# Patient Record
Sex: FEMALE | Race: WHITE | Employment: OTHER | URBAN - METROPOLITAN AREA
[De-identification: names, ages, dates, MRNs, and addresses within clinical notes are randomized per-mention and may not be internally consistent; named-entity substitution may affect disease eponyms.]

---

## 2017-01-23 PROCEDURE — 36415 COLL VENOUS BLD VENIPUNCTURE: CPT | Performed by: FAMILY MEDICINE

## 2017-01-23 PROCEDURE — 83036 HEMOGLOBIN GLYCOSYLATED A1C: CPT | Performed by: FAMILY MEDICINE

## 2017-01-23 PROCEDURE — 86780 TREPONEMA PALLIDUM: CPT | Performed by: FAMILY MEDICINE

## 2017-01-23 PROCEDURE — 84439 ASSAY OF FREE THYROXINE: CPT | Performed by: FAMILY MEDICINE

## 2017-01-23 PROCEDURE — 80061 LIPID PANEL: CPT | Performed by: FAMILY MEDICINE

## 2017-01-23 PROCEDURE — 87389 HIV-1 AG W/HIV-1&-2 AB AG IA: CPT | Performed by: FAMILY MEDICINE

## 2017-01-23 PROCEDURE — 80050 GENERAL HEALTH PANEL: CPT | Performed by: FAMILY MEDICINE

## 2017-03-15 ENCOUNTER — APPOINTMENT (OUTPATIENT)
Dept: CV DIAGNOSTICS | Facility: HOSPITAL | Age: 62
End: 2017-03-15
Attending: INTERNAL MEDICINE
Payer: COMMERCIAL

## 2017-03-15 ENCOUNTER — APPOINTMENT (OUTPATIENT)
Dept: ULTRASOUND IMAGING | Facility: HOSPITAL | Age: 62
End: 2017-03-15
Attending: EMERGENCY MEDICINE
Payer: COMMERCIAL

## 2017-03-15 ENCOUNTER — HOSPITAL ENCOUNTER (OUTPATIENT)
Facility: HOSPITAL | Age: 62
Setting detail: OBSERVATION
Discharge: HOME | End: 2017-03-16
Attending: EMERGENCY MEDICINE | Admitting: INTERNAL MEDICINE
Payer: COMMERCIAL

## 2017-03-15 ENCOUNTER — APPOINTMENT (OUTPATIENT)
Dept: GENERAL RADIOLOGY | Facility: HOSPITAL | Age: 62
End: 2017-03-15
Attending: EMERGENCY MEDICINE
Payer: COMMERCIAL

## 2017-03-15 DIAGNOSIS — E11.9 TYPE 2 DIABETES MELLITUS WITHOUT COMPLICATION, WITHOUT LONG-TERM CURRENT USE OF INSULIN (HCC): ICD-10-CM

## 2017-03-15 DIAGNOSIS — R07.9 ACUTE CHEST PAIN: Primary | ICD-10-CM

## 2017-03-15 LAB
ALBUMIN SERPL-MCNC: 4.4 G/DL (ref 3.5–4.8)
ALP LIVER SERPL-CCNC: 146 U/L (ref 50–130)
ALT SERPL-CCNC: 77 U/L (ref 14–54)
APTT PPP: 28.5 SECONDS (ref 25–34)
AST SERPL-CCNC: 57 U/L (ref 15–41)
ATRIAL RATE: 89 BPM
BASOPHILS # BLD AUTO: 0.08 X10(3) UL (ref 0–0.1)
BASOPHILS NFR BLD AUTO: 0.7 %
BILIRUB SERPL-MCNC: 0.8 MG/DL (ref 0.1–2)
BUN BLD-MCNC: 14 MG/DL (ref 8–20)
C-REACTIVE PROTEIN: 1.48 MG/DL (ref ?–1)
CALCIUM BLD-MCNC: 9.9 MG/DL (ref 8.3–10.3)
CHLORIDE: 102 MMOL/L (ref 101–111)
CO2: 24 MMOL/L (ref 22–32)
CREAT BLD-MCNC: 0.89 MG/DL (ref 0.55–1.02)
D-DIMER: <0.27 UG/ML FEU (ref 0–0.49)
EOSINOPHIL # BLD AUTO: 0 X10(3) UL (ref 0–0.3)
EOSINOPHIL NFR BLD AUTO: 0 %
ERYTHROCYTE [DISTWIDTH] IN BLOOD BY AUTOMATED COUNT: 13.2 % (ref 11.5–16)
GLUCOSE BLD-MCNC: 154 MG/DL (ref 65–99)
GLUCOSE BLD-MCNC: 197 MG/DL (ref 70–99)
HCT VFR BLD AUTO: 42.5 % (ref 34–50)
HGB BLD-MCNC: 14.8 G/DL (ref 12–16)
IMMATURE GRANULOCYTE COUNT: 0.04 X10(3) UL (ref 0–1)
IMMATURE GRANULOCYTE RATIO %: 0.3 %
INR BLD: 1.01 (ref 0.89–1.11)
LIPASE: 109 U/L (ref 73–393)
LYMPHOCYTES # BLD AUTO: 3.36 X10(3) UL (ref 0.9–4)
LYMPHOCYTES NFR BLD AUTO: 28.1 %
M PROTEIN MFR SERPL ELPH: 7.9 G/DL (ref 6.1–8.3)
MCH RBC QN AUTO: 32 PG (ref 27–33.2)
MCHC RBC AUTO-ENTMCNC: 34.8 G/DL (ref 31–37)
MCV RBC AUTO: 91.8 FL (ref 81–100)
MONOCYTES # BLD AUTO: 0.93 X10(3) UL (ref 0.1–0.6)
MONOCYTES NFR BLD AUTO: 7.8 %
NEUTROPHIL ABS PRELIM: 7.54 X10 (3) UL (ref 1.3–6.7)
NEUTROPHILS # BLD AUTO: 7.54 X10(3) UL (ref 1.3–6.7)
NEUTROPHILS NFR BLD AUTO: 63.1 %
P AXIS: 55 DEGREES
P-R INTERVAL: 152 MS
PLATELET # BLD AUTO: 208 10(3)UL (ref 150–450)
POTASSIUM SERPL-SCNC: 3.6 MMOL/L (ref 3.6–5.1)
POTASSIUM SERPL-SCNC: 4.4 MMOL/L (ref 3.6–5.1)
PSA SERPL DL<=0.01 NG/ML-MCNC: 13.3 SECONDS (ref 12–14.3)
Q-T INTERVAL: 380 MS
QRS DURATION: 74 MS
QTC CALCULATION (BEZET): 462 MS
R AXIS: 54 DEGREES
RBC # BLD AUTO: 4.63 X10(6)UL (ref 3.8–5.1)
RED CELL DISTRIBUTION WIDTH-SD: 44.5 FL (ref 35.1–46.3)
SED RATE-ML: 13 MM/HR (ref 0–25)
SODIUM SERPL-SCNC: 139 MMOL/L (ref 136–144)
T AXIS: 74 DEGREES
TROPONIN: <0.046 NG/ML (ref ?–0.05)
VENTRICULAR RATE: 89 BPM
WBC # BLD AUTO: 12 X10(3) UL (ref 4–13)

## 2017-03-15 PROCEDURE — 99285 EMERGENCY DEPT VISIT HI MDM: CPT

## 2017-03-15 PROCEDURE — 71010 XR CHEST AP PORTABLE  (CPT=71010): CPT

## 2017-03-15 PROCEDURE — 93010 ELECTROCARDIOGRAM REPORT: CPT

## 2017-03-15 PROCEDURE — 36415 COLL VENOUS BLD VENIPUNCTURE: CPT

## 2017-03-15 PROCEDURE — 93306 TTE W/DOPPLER COMPLETE: CPT

## 2017-03-15 PROCEDURE — 93005 ELECTROCARDIOGRAM TRACING: CPT

## 2017-03-15 PROCEDURE — 84484 ASSAY OF TROPONIN QUANT: CPT | Performed by: INTERNAL MEDICINE

## 2017-03-15 PROCEDURE — 86140 C-REACTIVE PROTEIN: CPT | Performed by: INTERNAL MEDICINE

## 2017-03-15 PROCEDURE — 85610 PROTHROMBIN TIME: CPT | Performed by: EMERGENCY MEDICINE

## 2017-03-15 PROCEDURE — 83690 ASSAY OF LIPASE: CPT | Performed by: EMERGENCY MEDICINE

## 2017-03-15 PROCEDURE — 85378 FIBRIN DEGRADE SEMIQUANT: CPT | Performed by: EMERGENCY MEDICINE

## 2017-03-15 PROCEDURE — 84132 ASSAY OF SERUM POTASSIUM: CPT | Performed by: HOSPITALIST

## 2017-03-15 PROCEDURE — 82962 GLUCOSE BLOOD TEST: CPT

## 2017-03-15 PROCEDURE — 84439 ASSAY OF FREE THYROXINE: CPT | Performed by: HOSPITALIST

## 2017-03-15 PROCEDURE — 84484 ASSAY OF TROPONIN QUANT: CPT | Performed by: EMERGENCY MEDICINE

## 2017-03-15 PROCEDURE — 85652 RBC SED RATE AUTOMATED: CPT | Performed by: INTERNAL MEDICINE

## 2017-03-15 PROCEDURE — 93306 TTE W/DOPPLER COMPLETE: CPT | Performed by: INTERNAL MEDICINE

## 2017-03-15 PROCEDURE — 76700 US EXAM ABDOM COMPLETE: CPT

## 2017-03-15 PROCEDURE — 80053 COMPREHEN METABOLIC PANEL: CPT | Performed by: EMERGENCY MEDICINE

## 2017-03-15 PROCEDURE — 85730 THROMBOPLASTIN TIME PARTIAL: CPT | Performed by: EMERGENCY MEDICINE

## 2017-03-15 PROCEDURE — 85025 COMPLETE CBC W/AUTO DIFF WBC: CPT | Performed by: EMERGENCY MEDICINE

## 2017-03-15 PROCEDURE — 84443 ASSAY THYROID STIM HORMONE: CPT | Performed by: HOSPITALIST

## 2017-03-15 RX ORDER — ESCITALOPRAM OXALATE 20 MG/1
20 TABLET ORAL DAILY
Status: DISCONTINUED | OUTPATIENT
Start: 2017-03-15 | End: 2017-03-16

## 2017-03-15 RX ORDER — ZOLPIDEM TARTRATE 5 MG/1
5 TABLET ORAL NIGHTLY PRN
Status: DISCONTINUED | OUTPATIENT
Start: 2017-03-15 | End: 2017-03-16

## 2017-03-15 RX ORDER — POTASSIUM CHLORIDE 20 MEQ/1
40 TABLET, EXTENDED RELEASE ORAL EVERY 4 HOURS
Status: COMPLETED | OUTPATIENT
Start: 2017-03-15 | End: 2017-03-15

## 2017-03-15 RX ORDER — ASPIRIN 81 MG/1
324 TABLET, CHEWABLE ORAL ONCE
Status: COMPLETED | OUTPATIENT
Start: 2017-03-15 | End: 2017-03-15

## 2017-03-15 RX ORDER — ALPRAZOLAM 1 MG/1
1 TABLET ORAL 2 TIMES DAILY PRN
Status: DISCONTINUED | OUTPATIENT
Start: 2017-03-15 | End: 2017-03-16

## 2017-03-15 RX ORDER — SODIUM CHLORIDE 9 MG/ML
INJECTION, SOLUTION INTRAVENOUS CONTINUOUS
Status: ACTIVE | OUTPATIENT
Start: 2017-03-15 | End: 2017-03-15

## 2017-03-15 RX ORDER — ATORVASTATIN CALCIUM 10 MG/1
20 TABLET, FILM COATED ORAL NIGHTLY
Status: DISCONTINUED | OUTPATIENT
Start: 2017-03-15 | End: 2017-03-16

## 2017-03-15 RX ORDER — LOSARTAN POTASSIUM 100 MG/1
100 TABLET ORAL DAILY
Status: DISCONTINUED | OUTPATIENT
Start: 2017-03-15 | End: 2017-03-16

## 2017-03-15 RX ORDER — ONDANSETRON 2 MG/ML
4 INJECTION INTRAMUSCULAR; INTRAVENOUS EVERY 4 HOURS PRN
Status: DISCONTINUED | OUTPATIENT
Start: 2017-03-15 | End: 2017-03-16

## 2017-03-15 RX ORDER — ACETAMINOPHEN 325 MG/1
650 TABLET ORAL EVERY 6 HOURS PRN
Status: DISCONTINUED | OUTPATIENT
Start: 2017-03-15 | End: 2017-03-16

## 2017-03-15 NOTE — H&P
AUBRIEG hospitalist H+P    PCP; José Miguel Sims MD  CC: pain    HPI 58 yo female with hx of anxiety, HTN, HL, depression presented for evaluation of chest discomfort. Started last night, described as tightness, radiated to jaw, felt seaty.  Currently no Encounter:  Zolpidem Tartrate (AMBIEN) 10 MG Oral Tab Take 1 tablet (10 mg total) by mouth nightly as needed for Sleep. Disp: 30 tablet Rfl: 5   alprazolam 1 MG Oral Tab Take 1 tablet (1 mg total) by mouth 2 (two) times daily as needed for Anxiety.  Disp: 6

## 2017-03-15 NOTE — CONSULTS
[facility] Cardiology Consultation 3/15/2017      [facility] Patient Status:  Observation    1955 MRN [id number]   [facility] 0SW-A Attending Ever Buckley MD   Hosp Day # 0 PCP Levy Su MD     Initial JAIMIE ALCANTARA at [facility]  10-23-12 [facility]     Family History   Problem Relation Age of Onset   • Heart Disease Father    • Cancer Father      prostate cancer   • Heart Disorder Father    • Heart Disease Mother    • Diabete process    Labs:   HEM:  Recent Labs   Lab  03/15/17   0602   WBC  12.0   HGB  14.8   PLT  208.0       Chem:  Recent Labs   Lab  03/15/17   0602   NA  139   K  3.6   CL  102   CO2  24.0   BUN  14   CREATSERUM  0.89   CA  9.9   GLU  197*       Recent Labs

## 2017-03-15 NOTE — ED PROVIDER NOTES
Patient Seen in: [facility] Emergency Department    History   Patient presents with:  Chest Pain Angina (cardiovascular)    Stated Complaint:     HPI    Patient is a 68-year-old female with medical history including hypertension, high cholesterol, d tablet (10 mg total) by mouth nightly as needed for Sleep. alprazolam 1 MG Oral Tab,  Take 1 tablet (1 mg total) by mouth 2 (two) times daily as needed for Anxiety. Atorvastatin Calcium 20 MG Oral Tab,  Take 1 tablet (20 mg total) by mouth once daily. nontoxic in appearance. HEENT: Head is normocephalic, atraumatic. Pupils are 3 mm equally round and reactive to light. Oropharynx is clear. Mucous membranes are moist.  NECK: There is no focal tenderness to palpation appreciated. There is no JVD.  No menin Therapeutic Range is approximately 65- 104 seconds. The therapeutic range has been validated against 0.3-0.7 heparin anti-Xa units/mL.      PROTHROMBIN TIME (PT) - Normal   LIPASE - Normal   TROPONIN I - Normal   CBC WITH DIFFERENTIAL WITH PLATELET    Tobi Chua symptoms and risk factors as noted above the patient will be admitted to the hospital for further care at this time. Patient was admitted for further care at this time.         Disposition and Plan     Clinical Impression:  Acute chest pain  (primary encou

## 2017-03-15 NOTE — PLAN OF CARE
Problem: CARDIOVASCULAR - ADULT  Goal: Maintains optimal cardiac output and hemodynamic stability  INTERVENTIONS:  - Monitor vital signs, rhythm, and trends  - Monitor for bleeding, hypotension and signs of decreased cardiac output  - Evaluate effectivenes chest pain, c/o mild pain right jaw, pain scale 0 to 10, pt scale 1  Troponin x2 normal  Dr Jaymie Wall paged for admit  Admit Navigators completed  Will continue to monitor.

## 2017-03-16 ENCOUNTER — APPOINTMENT (OUTPATIENT)
Dept: CV DIAGNOSTICS | Facility: HOSPITAL | Age: 62
End: 2017-03-16
Attending: INTERNAL MEDICINE
Payer: COMMERCIAL

## 2017-03-16 VITALS
BODY MASS INDEX: 35.44 KG/M2 | SYSTOLIC BLOOD PRESSURE: 126 MMHG | HEART RATE: 87 BPM | HEIGHT: 63 IN | OXYGEN SATURATION: 97 % | RESPIRATION RATE: 16 BRPM | WEIGHT: 200 LBS | DIASTOLIC BLOOD PRESSURE: 68 MMHG | TEMPERATURE: 98 F

## 2017-03-16 LAB
ATRIAL RATE: 65 BPM
FREE T4: 1 NG/DL (ref 0.9–1.8)
GLUCOSE BLD-MCNC: 157 MG/DL (ref 65–99)
GLUCOSE BLD-MCNC: 208 MG/DL (ref 65–99)
GLUCOSE BLD-MCNC: 264 MG/DL (ref 65–99)
P AXIS: 49 DEGREES
P-R INTERVAL: 160 MS
Q-T INTERVAL: 432 MS
QRS DURATION: 80 MS
QTC CALCULATION (BEZET): 449 MS
R AXIS: 61 DEGREES
T AXIS: 78 DEGREES
TSI SER-ACNC: 1.72 MIU/ML (ref 0.35–5.5)
VENTRICULAR RATE: 65 BPM

## 2017-03-16 PROCEDURE — 78452 HT MUSCLE IMAGE SPECT MULT: CPT

## 2017-03-16 PROCEDURE — 93010 ELECTROCARDIOGRAM REPORT: CPT | Performed by: INTERNAL MEDICINE

## 2017-03-16 PROCEDURE — 93005 ELECTROCARDIOGRAM TRACING: CPT

## 2017-03-16 PROCEDURE — 93018 CV STRESS TEST I&R ONLY: CPT | Performed by: INTERNAL MEDICINE

## 2017-03-16 PROCEDURE — 82962 GLUCOSE BLOOD TEST: CPT

## 2017-03-16 PROCEDURE — 93017 CV STRESS TEST TRACING ONLY: CPT

## 2017-03-16 RX ORDER — BLOOD-GLUCOSE METER
1 EACH MISCELLANEOUS
Qty: 1 KIT | Refills: 0 | Status: SHIPPED | OUTPATIENT
Start: 2017-03-16 | End: 2019-01-24

## 2017-03-16 RX ORDER — LANCETS 33 GAUGE
1 EACH MISCELLANEOUS
Qty: 50 EACH | Refills: 0 | Status: SHIPPED | OUTPATIENT
Start: 2017-03-16 | End: 2017-03-31

## 2017-03-16 RX ORDER — CYCLOBENZAPRINE HCL 10 MG
5 TABLET ORAL 3 TIMES DAILY PRN
Status: DISCONTINUED | OUTPATIENT
Start: 2017-03-16 | End: 2017-03-16

## 2017-03-16 RX ORDER — LANCETS 28 GAUGE
EACH MISCELLANEOUS
Qty: 50 EACH | Refills: 6 | Status: SHIPPED | OUTPATIENT
Start: 2017-03-16 | End: 2017-03-16

## 2017-03-16 RX ORDER — BLOOD-GLUCOSE METER
KIT MISCELLANEOUS
Qty: 1 KIT | Refills: 0 | Status: SHIPPED | OUTPATIENT
Start: 2017-03-16 | End: 2017-03-16

## 2017-03-16 NOTE — DISCHARGE SUMMARY
[facility]  Discharge Summary    Barry Valente Patient Status:  Observation    1955 MRN [id number]   [facility] 0SW-A Attending Yenni Thomas MD   [address] Day # 1 PCP Mavis Steele MD     Date of Admission: 3/15/2017    Date pain, jaw pain  -monitored on telemetry, troponin <0.046, D-dimer <0.027  ASA, statin  Cardiology consult appreciated, Echo done,  ESR, CRP  -no evidence of myocardial ischemia on stress test.  Per cardiology probable musculoskeletal pain    Abnormal LFTs, daily.  Qty: 90 tablet Refills: 0  Associated Diagnoses:Hyperlipidemia, unspecified hyperlipidemia type    Losartan Potassium-HCTZ 100-25 MG Oral Tab  Take 1 tablet by mouth once daily.   Qty: 90 tablet Refills: 2    escitalopram 20 MG Oral Tab  Take 1 tabl acetaminophen within 24 hours  Anant Marrero  3/16/2017  5:29 PM

## 2017-03-16 NOTE — PAYOR COMM NOTE
Attending Physician: Kiera Padgett MD    3/15    ED    Chest Pain Angina         Patient is a 64-year-old female with medical history including hypertension, high cholesterol,  presents emergency room with a history of chest tightness which started last n FEU = Fibrinogen Equivalent Units.     D-Dimer results of less than 0.5 ug/mL (FEU) have been shown to contribute to the exclusion of venous thromboembolism with a negative predictive value of approximately 95% when results are used as part of the total

## 2017-03-16 NOTE — PLAN OF CARE
Pt has been sweating off and on today. Her blood sugar is 264. Called Dr. Zack Sharma and she ordered Diabetic Education. Pt refused a glucometer at present and states that she would like to talk to Dr. Adelaida Antony first at her appointment.  She does not feel that

## 2017-03-16 NOTE — PLAN OF CARE
PT A/O, LUNGS CLEAR, DENIES CHEST PAIN OR SOB, SLIGHT RT JAW PAIN, SR, HR 70'S, INSTRUCTED PT ON POC.   CARDIOVASCULAR - ADULT    • Maintains optimal cardiac output and hemodynamic stability Progressing    • Absence of cardiac arrhythmias or at baseline Pro

## 2017-03-16 NOTE — CONSULTS
[facility]  Diabetes Consult Note    Sapna Burrell Patient Status:  Observation    1955 MRN [id number]   [facility] 0SW-A Attending Miguelito Bassett MD   Hosp Day # 1 PCP Rob Whitaker MD     Reason for Consult:     New onse back.  Dr. Rosemarie Jeffries order outpatient diabetes education; appointment is scheduled - see AVS.    Education Provided:    · Taught survival skills:  · Provided \"Understanding Diabetes: Basic Survival Skills\" booklet and had patient view video on TV Education

## 2017-03-16 NOTE — PROGRESS NOTES
CARDIODIAGNOSTIC PRELIMINARY REPORT:    SHRUTHI protocol completed with frequent ventricular abnormalities noted at peak    Denied cardiac symptoms    Base:  152/84, HR 86;      Peak:  174/86,    (94% APMHR)    Second set of images pending

## 2017-03-16 NOTE — PROGRESS NOTES
[facility] Cardiology Progress Note        Sharon Skinner Patient Status:  Observation    1955 MRN [id number]   [facility] 0SW-A Attending Saint Hausen, MD   [facility] Day # 1 SHERLYN Chacon 20 mg Oral Nightly   • escitalopram  20 mg Oral Daily   • Losartan Potassium  100 mg Oral Daily              Recent Labs   03/15/17  0602   WBC 12.0   HGB 14.8   .0       Recent Labs   03/15/17  2313 03/15/17  1407 03/15/17  0817 03/15/17  0602   BU

## 2017-03-20 NOTE — PAYOR COMM NOTE
Review Type: CONTINUED STAY  Reviewer: Julianna MIDDLETON     Date: March 20, 2017 - 12:45 PM  Payor: Paulo CHANEY [email or web address]  Authorization Number: N/A  Admit date: 3/15/2017  5:54 AM        REVIEWER COMMENTS: DISCHARGE  3-16-17

## 2017-06-22 PROBLEM — H90.3 SENSORINEURAL HEARING LOSS, BILATERAL: Status: ACTIVE | Noted: 2017-06-22

## 2018-01-16 PROBLEM — E11.9 TYPE 2 DIABETES MELLITUS WITHOUT COMPLICATION, WITHOUT LONG-TERM CURRENT USE OF INSULIN (HCC): Status: ACTIVE | Noted: 2018-01-16

## 2018-05-16 ENCOUNTER — HOSPITAL (OUTPATIENT)
Dept: OTHER | Age: 63
End: 2018-05-16
Attending: INTERNAL MEDICINE

## 2018-06-18 ENCOUNTER — HOSPITAL (OUTPATIENT)
Dept: OTHER | Age: 63
End: 2018-06-18
Attending: INTERNAL MEDICINE

## 2018-10-27 PROBLEM — E78.00 PURE HYPERCHOLESTEROLEMIA: Status: ACTIVE | Noted: 2018-10-27

## 2019-01-09 PROCEDURE — 87086 URINE CULTURE/COLONY COUNT: CPT | Performed by: FAMILY MEDICINE

## 2019-07-15 PROCEDURE — 86706 HEP B SURFACE ANTIBODY: CPT | Performed by: INTERNAL MEDICINE

## 2019-07-15 PROCEDURE — 82103 ALPHA-1-ANTITRYPSIN TOTAL: CPT | Performed by: INTERNAL MEDICINE

## 2019-07-15 PROCEDURE — 86803 HEPATITIS C AB TEST: CPT | Performed by: INTERNAL MEDICINE

## 2019-07-30 ENCOUNTER — APPOINTMENT (OUTPATIENT)
Dept: LAB | Facility: HOSPITAL | Age: 64
End: 2019-07-30
Payer: MEDICARE

## 2019-07-30 DIAGNOSIS — K40.90 RIGHT INGUINAL HERNIA: ICD-10-CM

## 2019-07-30 LAB
ALBUMIN SERPL-MCNC: 4.1 G/DL (ref 3.4–5)
ALBUMIN/GLOB SERPL: 1.1 {RATIO} (ref 1–2)
ALP LIVER SERPL-CCNC: 120 U/L (ref 55–142)
ALT SERPL-CCNC: 39 U/L (ref 13–56)
ANION GAP SERPL CALC-SCNC: 7 MMOL/L (ref 0–18)
AST SERPL-CCNC: 33 U/L (ref 15–37)
BILIRUB SERPL-MCNC: 0.4 MG/DL (ref 0.1–2)
BUN BLD-MCNC: 14 MG/DL (ref 7–18)
BUN/CREAT SERPL: 17.5 (ref 10–20)
CALCIUM BLD-MCNC: 9.7 MG/DL (ref 8.5–10.1)
CHLORIDE SERPL-SCNC: 107 MMOL/L (ref 98–112)
CO2 SERPL-SCNC: 27 MMOL/L (ref 21–32)
CREAT BLD-MCNC: 0.8 MG/DL (ref 0.55–1.02)
GLOBULIN PLAS-MCNC: 3.6 G/DL (ref 2.8–4.4)
GLUCOSE BLD-MCNC: 102 MG/DL (ref 70–99)
M PROTEIN MFR SERPL ELPH: 7.7 G/DL (ref 6.4–8.2)
OSMOLALITY SERPL CALC.SUM OF ELEC: 293 MOSM/KG (ref 275–295)
POTASSIUM SERPL-SCNC: 3.7 MMOL/L (ref 3.5–5.1)
SODIUM SERPL-SCNC: 141 MMOL/L (ref 136–145)

## 2019-07-30 PROCEDURE — 88175 CYTOPATH C/V AUTO FLUID REDO: CPT | Performed by: OBSTETRICS & GYNECOLOGY

## 2019-07-30 PROCEDURE — 80053 COMPREHEN METABOLIC PANEL: CPT

## 2019-07-30 PROCEDURE — 93005 ELECTROCARDIOGRAM TRACING: CPT

## 2019-07-30 PROCEDURE — 36415 COLL VENOUS BLD VENIPUNCTURE: CPT

## 2019-07-30 PROCEDURE — 87624 HPV HI-RISK TYP POOLED RSLT: CPT | Performed by: OBSTETRICS & GYNECOLOGY

## 2019-07-30 PROCEDURE — 93010 ELECTROCARDIOGRAM REPORT: CPT | Performed by: INTERNAL MEDICINE

## 2019-07-31 LAB
ATRIAL RATE: 68 BPM
P AXIS: 53 DEGREES
P-R INTERVAL: 164 MS
Q-T INTERVAL: 424 MS
QRS DURATION: 80 MS
QTC CALCULATION (BEZET): 450 MS
R AXIS: 47 DEGREES
T AXIS: 64 DEGREES
VENTRICULAR RATE: 68 BPM

## 2019-08-16 PROBLEM — G47.30 SLEEP APNEA: Status: ACTIVE | Noted: 2019-08-16

## 2019-08-28 PROCEDURE — 88305 TISSUE EXAM BY PATHOLOGIST: CPT | Performed by: INTERNAL MEDICINE

## 2019-10-31 PROBLEM — Z98.890 S/P LUMBAR LAMINECTOMY: Status: ACTIVE | Noted: 2019-10-31

## 2019-11-21 PROBLEM — I15.2 OBESITY, DIABETES, AND HYPERTENSION SYNDROME: Status: ACTIVE | Noted: 2019-11-21

## 2019-11-21 PROBLEM — K74.60 LIVER CIRRHOSIS SECONDARY TO NASH (HCC): Status: ACTIVE | Noted: 2019-11-21

## 2019-11-21 PROBLEM — E11.69 OBESITY, DIABETES, AND HYPERTENSION SYNDROME (HCC): Status: ACTIVE | Noted: 2019-11-21

## 2019-11-21 PROBLEM — E66.9 OBESITY, DIABETES, AND HYPERTENSION SYNDROME (HCC): Status: ACTIVE | Noted: 2019-11-21

## 2019-11-21 PROBLEM — E11.69 OBESITY, DIABETES, AND HYPERTENSION SYNDROME: Status: ACTIVE | Noted: 2019-11-21

## 2019-11-21 PROBLEM — E11.59 OBESITY, DIABETES, AND HYPERTENSION SYNDROME: Status: ACTIVE | Noted: 2019-11-21

## 2019-11-21 PROBLEM — K75.81 LIVER CIRRHOSIS SECONDARY TO NASH (HCC): Status: ACTIVE | Noted: 2019-11-21

## 2019-11-21 PROBLEM — E66.9 OBESITY, DIABETES, AND HYPERTENSION SYNDROME: Status: ACTIVE | Noted: 2019-11-21

## 2019-11-21 PROBLEM — E11.59 OBESITY, DIABETES, AND HYPERTENSION SYNDROME (HCC): Status: ACTIVE | Noted: 2019-11-21

## 2019-11-21 PROBLEM — I15.2 OBESITY, DIABETES, AND HYPERTENSION SYNDROME (HCC): Status: ACTIVE | Noted: 2019-11-21

## 2019-11-21 PROBLEM — K76.82 HEPATIC ENCEPHALOPATHY (HCC): Status: ACTIVE | Noted: 2019-11-21

## 2019-11-21 PROBLEM — K72.90 HEPATIC ENCEPHALOPATHY (HCC): Status: ACTIVE | Noted: 2019-11-21

## 2019-11-21 PROBLEM — K76.6 PORTAL HYPERTENSION (HCC): Status: ACTIVE | Noted: 2019-11-21

## 2019-11-21 PROBLEM — I85.00 ESOPHAGEAL VARICES WITHOUT BLEEDING (HCC): Status: ACTIVE | Noted: 2019-11-21

## 2019-11-21 PROBLEM — K76.82 HEPATIC ENCEPHALOPATHY: Status: ACTIVE | Noted: 2019-11-21

## 2020-03-04 PROBLEM — R07.9 ACUTE CHEST PAIN: Status: RESOLVED | Noted: 2017-03-15 | Resolved: 2020-03-04

## 2020-07-20 PROBLEM — M54.50 CHRONIC BILATERAL LOW BACK PAIN WITHOUT SCIATICA: Status: ACTIVE | Noted: 2020-07-20

## 2020-07-20 PROBLEM — G89.29 CHRONIC BILATERAL LOW BACK PAIN WITHOUT SCIATICA: Status: ACTIVE | Noted: 2020-07-20

## 2020-07-21 PROBLEM — M54.6 PAIN IN THORACIC SPINE: Status: ACTIVE | Noted: 2020-07-21

## 2020-07-21 PROBLEM — G25.81 RESTLESS LEG SYNDROME: Status: ACTIVE | Noted: 2020-07-21

## 2020-08-20 PROBLEM — M43.10 SPONDYLOLISTHESIS, GRADE 1: Status: ACTIVE | Noted: 2020-08-20

## 2020-10-27 ENCOUNTER — APPOINTMENT (OUTPATIENT)
Dept: CT IMAGING | Facility: HOSPITAL | Age: 65
End: 2020-10-27
Attending: EMERGENCY MEDICINE
Payer: MEDICARE

## 2020-10-27 ENCOUNTER — HOSPITAL ENCOUNTER (EMERGENCY)
Facility: HOSPITAL | Age: 65
Discharge: HOME | End: 2020-10-27
Attending: EMERGENCY MEDICINE
Payer: MEDICARE

## 2020-10-27 ENCOUNTER — APPOINTMENT (OUTPATIENT)
Dept: GENERAL RADIOLOGY | Facility: HOSPITAL | Age: 65
End: 2020-10-27
Attending: EMERGENCY MEDICINE
Payer: MEDICARE

## 2020-10-27 VITALS
BODY MASS INDEX: 35 KG/M2 | HEART RATE: 74 BPM | RESPIRATION RATE: 16 BRPM | SYSTOLIC BLOOD PRESSURE: 130 MMHG | TEMPERATURE: 98 F | DIASTOLIC BLOOD PRESSURE: 66 MMHG | WEIGHT: 190.06 LBS | OXYGEN SATURATION: 94 %

## 2020-10-27 DIAGNOSIS — R42 DIZZINESS: Primary | ICD-10-CM

## 2020-10-27 PROCEDURE — 36415 COLL VENOUS BLD VENIPUNCTURE: CPT

## 2020-10-27 PROCEDURE — 80320 DRUG SCREEN QUANTALCOHOLS: CPT | Performed by: EMERGENCY MEDICINE

## 2020-10-27 PROCEDURE — 82140 ASSAY OF AMMONIA: CPT | Performed by: EMERGENCY MEDICINE

## 2020-10-27 PROCEDURE — 93010 ELECTROCARDIOGRAM REPORT: CPT

## 2020-10-27 PROCEDURE — 99285 EMERGENCY DEPT VISIT HI MDM: CPT

## 2020-10-27 PROCEDURE — 81003 URINALYSIS AUTO W/O SCOPE: CPT | Performed by: EMERGENCY MEDICINE

## 2020-10-27 PROCEDURE — 85025 COMPLETE CBC W/AUTO DIFF WBC: CPT | Performed by: EMERGENCY MEDICINE

## 2020-10-27 PROCEDURE — 71045 X-RAY EXAM CHEST 1 VIEW: CPT | Performed by: EMERGENCY MEDICINE

## 2020-10-27 PROCEDURE — 80053 COMPREHEN METABOLIC PANEL: CPT | Performed by: EMERGENCY MEDICINE

## 2020-10-27 PROCEDURE — 99284 EMERGENCY DEPT VISIT MOD MDM: CPT

## 2020-10-27 PROCEDURE — 82962 GLUCOSE BLOOD TEST: CPT

## 2020-10-27 PROCEDURE — 93005 ELECTROCARDIOGRAM TRACING: CPT

## 2020-10-27 PROCEDURE — 70450 CT HEAD/BRAIN W/O DYE: CPT | Performed by: EMERGENCY MEDICINE

## 2020-10-27 NOTE — ED NOTES
Pt not taking liver medication, pt c/o dizziness, pt denies cp sob or loc, pt aox3, nad ,skin warm and intact, pt ambulates steady and unassisted

## 2020-10-27 NOTE — ED PROVIDER NOTES
Patient Seen in: [facility] Emergency Department      History   Patient presents with:  Altered Mental Status    Stated Complaint: Doesn't like taking lactolose, hasn't taken it in a week.      HPI    68-year-old female with a history of [place] cirrhos 15813, [address]375 N/A 8/28/2019    Performed by Leydi Davis MD at [facility]   • [address] / TRANSFORAMINAL EPIDURAL STEROID INJECTION Bilateral 9/4/2020    Performed by Genaro Patel DO at [address] [facility] cyanosis edema.   Mental status alert and oriented ×3  Cranial nerves II through XII within normal limits  Motor function is intact in all 4 extremities  Sensation is intact in all 4 extremities  Cerebellar finger-nose and heel-to-shin are normal  Deep tend normal neurologic exam able to ambulate without difficulty. She was discharged advised follow-up with her doctor in few days take medicine as prescribed return any problems.                    Disposition and Plan     Clinical Impression:  Dizziness  (prim

## 2020-10-27 NOTE — ED INITIAL ASSESSMENT (HPI)
Pt presents to ed from home where pt has increasing confusion and reports not taking her lactulose because it makes her nauseated

## 2020-12-03 PROBLEM — E66.9 OBESITY, DIABETES, AND HYPERTENSION SYNDROME: Status: RESOLVED | Noted: 2019-11-21 | Resolved: 2020-12-03

## 2020-12-03 PROBLEM — E66.9 OBESITY, DIABETES, AND HYPERTENSION SYNDROME (HCC): Status: RESOLVED | Noted: 2019-11-21 | Resolved: 2020-12-03

## 2020-12-03 PROBLEM — E11.59 OBESITY, DIABETES, AND HYPERTENSION SYNDROME (HCC): Status: RESOLVED | Noted: 2019-11-21 | Resolved: 2020-12-03

## 2020-12-03 PROBLEM — I15.2 OBESITY, DIABETES, AND HYPERTENSION SYNDROME: Status: RESOLVED | Noted: 2019-11-21 | Resolved: 2020-12-03

## 2020-12-03 PROBLEM — E11.69 OBESITY, DIABETES, AND HYPERTENSION SYNDROME: Status: RESOLVED | Noted: 2019-11-21 | Resolved: 2020-12-03

## 2020-12-03 PROBLEM — E11.59 OBESITY, DIABETES, AND HYPERTENSION SYNDROME: Status: RESOLVED | Noted: 2019-11-21 | Resolved: 2020-12-03

## 2020-12-03 PROBLEM — I15.2 OBESITY, DIABETES, AND HYPERTENSION SYNDROME (HCC): Status: RESOLVED | Noted: 2019-11-21 | Resolved: 2020-12-03

## 2020-12-03 PROBLEM — E11.69 OBESITY, DIABETES, AND HYPERTENSION SYNDROME (HCC): Status: RESOLVED | Noted: 2019-11-21 | Resolved: 2020-12-03

## 2020-12-28 PROBLEM — E11.65 TYPE 2 DIABETES MELLITUS WITH HYPERGLYCEMIA, WITHOUT LONG-TERM CURRENT USE OF INSULIN (HCC): Status: ACTIVE | Noted: 2020-12-28

## 2021-07-30 PROBLEM — R41.3 MEMORY LOSS: Status: ACTIVE | Noted: 2021-07-30

## 2021-08-04 ENCOUNTER — LABORATORY ENCOUNTER (OUTPATIENT)
Dept: LAB | Facility: HOSPITAL | Age: 66
End: 2021-08-04
Payer: MEDICARE

## 2021-08-04 ENCOUNTER — EKG ENCOUNTER (OUTPATIENT)
Dept: LAB | Facility: HOSPITAL | Age: 66
End: 2021-08-04
Payer: MEDICARE

## 2021-08-04 DIAGNOSIS — H25.13 NUCLEAR SCLEROTIC CATARACT OF BOTH EYES: ICD-10-CM

## 2021-08-04 LAB
ALBUMIN SERPL-MCNC: 3.8 G/DL (ref 3.4–5)
ALP LIVER SERPL-CCNC: 127 U/L
ALT SERPL-CCNC: 30 U/L
AST SERPL-CCNC: 27 U/L (ref 15–37)
ATRIAL RATE: 62 BPM
BILIRUB DIRECT SERPL-MCNC: 0.2 MG/DL (ref 0–0.2)
BILIRUB SERPL-MCNC: 0.8 MG/DL (ref 0.1–2)
M PROTEIN MFR SERPL ELPH: 7.6 G/DL (ref 6.4–8.2)
P AXIS: 56 DEGREES
P-R INTERVAL: 166 MS
Q-T INTERVAL: 428 MS
QRS DURATION: 72 MS
QTC CALCULATION (BEZET): 434 MS
R AXIS: 76 DEGREES
T AXIS: 66 DEGREES
VENTRICULAR RATE: 62 BPM

## 2021-08-04 PROCEDURE — 36415 COLL VENOUS BLD VENIPUNCTURE: CPT

## 2021-08-04 PROCEDURE — 80076 HEPATIC FUNCTION PANEL: CPT

## 2021-08-04 PROCEDURE — 93010 ELECTROCARDIOGRAM REPORT: CPT | Performed by: INTERNAL MEDICINE

## 2021-08-04 PROCEDURE — 93005 ELECTROCARDIOGRAM TRACING: CPT

## 2021-08-08 ENCOUNTER — ANESTHESIA EVENT (OUTPATIENT)
Dept: SURGERY | Facility: HOSPITAL | Age: 66
End: 2021-08-08
Payer: MEDICARE

## 2021-08-08 NOTE — ANESTHESIA PREPROCEDURE EVALUATION
PRE-OP EVALUATION    Patient Name: Hailey Rdz    Admit Diagnosis: Nuclear sclerotic cataract of right eye [H25.11]    Pre-op Diagnosis: Nuclear sclerotic cataract of right eye [H25.11]    PHACOEMULSIFICATION OF RIGHT CATARACT WITH PLACEMENT OF [employer] Tab, TAKE 1 TABLET BY MOUTH EVERY DAY, Disp: 90 tablet, Rfl: 0, 8/8/2021 at 0930  PROPRANOLOL HCL 10 MG Oral Tab, TAKE 1 TABLET BY MOUTH THREE TIMES A DAY, Disp: 270 tablet, Rfl: 0, 8/9/2021 at 0630  ATORVASTATIN 20 MG Oral Tab, TAKE 1 TABLET BY MOUTH EVER rhythm   Normal ECG   When compared with ECG of 27-OCT-2020 15:24,   No significant change was found   Confirmed by Marlen Lugo M.D., VICTOR ([id number]) on 8/4/2021 2:48:54 PM    ECG reviewed.   Exercise tolerance: good     MET: >4    (+) obesity  (+) hypertension Results   Component Value Date     08/02/2021    K 4.29 08/02/2021     08/02/2021    CO2 23.9 08/02/2021    BUN 11.0 08/02/2021    CREATSERUM 0.51 08/02/2021     (H) 08/02/2021    CA 9.4 08/02/2021            Airway      Mallampati: III

## 2021-08-09 ENCOUNTER — HOSPITAL ENCOUNTER (OUTPATIENT)
Facility: HOSPITAL | Age: 66
Setting detail: HOSPITAL OUTPATIENT SURGERY
Discharge: HOME | End: 2021-08-09
Attending: OPHTHALMOLOGY | Admitting: OPHTHALMOLOGY
Payer: MEDICARE

## 2021-08-09 ENCOUNTER — ANESTHESIA (OUTPATIENT)
Dept: SURGERY | Facility: HOSPITAL | Age: 66
End: 2021-08-09
Payer: MEDICARE

## 2021-08-09 VITALS
TEMPERATURE: 97 F | HEART RATE: 71 BPM | DIASTOLIC BLOOD PRESSURE: 70 MMHG | BODY MASS INDEX: 34.06 KG/M2 | RESPIRATION RATE: 18 BRPM | HEIGHT: 63 IN | SYSTOLIC BLOOD PRESSURE: 118 MMHG | OXYGEN SATURATION: 93 % | WEIGHT: 192.25 LBS

## 2021-08-09 DIAGNOSIS — H25.13 NUCLEAR SCLEROTIC CATARACT OF BOTH EYES: Primary | ICD-10-CM

## 2021-08-09 DIAGNOSIS — H25.11 NUCLEAR SCLEROTIC CATARACT OF RIGHT EYE: ICD-10-CM

## 2021-08-09 LAB
GLUCOSE BLD-MCNC: 126 MG/DL (ref 70–99)
GLUCOSE BLD-MCNC: 140 MG/DL (ref 70–99)

## 2021-08-09 PROCEDURE — 82962 GLUCOSE BLOOD TEST: CPT

## 2021-08-09 PROCEDURE — 08RJ3JZ REPLACEMENT OF RIGHT LENS WITH SYNTHETIC SUBSTITUTE, PERCUTANEOUS APPROACH: ICD-10-PCS | Performed by: OPHTHALMOLOGY

## 2021-08-09 DEVICE — TECNIS SMPLCTY TECNIS 1PC CLR MONO 24.5D
Type: IMPLANTABLE DEVICE | Site: EYE | Status: FUNCTIONAL
Brand: TECNIS SIMPLICITY

## 2021-08-09 RX ORDER — MOXIFLOXACIN 5 MG/ML
SOLUTION/ DROPS OPHTHALMIC AS NEEDED
Status: DISCONTINUED | OUTPATIENT
Start: 2021-08-09 | End: 2021-08-09 | Stop reason: HOSPADM

## 2021-08-09 RX ORDER — TETRACAINE HYDROCHLORIDE 5 MG/ML
1 SOLUTION OPHTHALMIC SEE ADMIN INSTRUCTIONS
Status: COMPLETED | OUTPATIENT
Start: 2021-08-09 | End: 2021-08-09

## 2021-08-09 RX ORDER — ACETAMINOPHEN 500 MG
1000 TABLET ORAL ONCE
Status: DISCONTINUED | OUTPATIENT
Start: 2021-08-09 | End: 2021-08-09

## 2021-08-09 RX ORDER — CYCLOPENTOLATE HYDROCHLORIDE 10 MG/ML
SOLUTION/ DROPS OPHTHALMIC
Status: DISCONTINUED
Start: 2021-08-09 | End: 2021-08-09

## 2021-08-09 RX ORDER — CYCLOPENTOLATE HYDROCHLORIDE 10 MG/ML
1 SOLUTION/ DROPS OPHTHALMIC SEE ADMIN INSTRUCTIONS
Status: COMPLETED | OUTPATIENT
Start: 2021-08-09 | End: 2021-08-09

## 2021-08-09 RX ORDER — LIDOCAINE HYDROCHLORIDE 10 MG/ML
INJECTION, SOLUTION EPIDURAL; INFILTRATION; INTRACAUDAL; PERINEURAL AS NEEDED
Status: DISCONTINUED | OUTPATIENT
Start: 2021-08-09 | End: 2021-08-09 | Stop reason: HOSPADM

## 2021-08-09 RX ORDER — ONDANSETRON 2 MG/ML
4 INJECTION INTRAMUSCULAR; INTRAVENOUS AS NEEDED
Status: DISCONTINUED | OUTPATIENT
Start: 2021-08-09 | End: 2021-08-09

## 2021-08-09 RX ORDER — DEXTROSE MONOHYDRATE 25 G/50ML
50 INJECTION, SOLUTION INTRAVENOUS
Status: DISCONTINUED | OUTPATIENT
Start: 2021-08-09 | End: 2021-08-09 | Stop reason: HOSPADM

## 2021-08-09 RX ORDER — PREDNISOLONE ACETATE 10 MG/ML
SUSPENSION/ DROPS OPHTHALMIC AS NEEDED
Status: DISCONTINUED | OUTPATIENT
Start: 2021-08-09 | End: 2021-08-09 | Stop reason: HOSPADM

## 2021-08-09 RX ORDER — TETRACAINE HYDROCHLORIDE 5 MG/ML
SOLUTION OPHTHALMIC
Status: COMPLETED
Start: 2021-08-09 | End: 2021-08-09

## 2021-08-09 RX ORDER — PHENYLEPHRINE HCL 2.5 %
1 DROPS OPHTHALMIC (EYE) SEE ADMIN INSTRUCTIONS
Status: COMPLETED | OUTPATIENT
Start: 2021-08-09 | End: 2021-08-09

## 2021-08-09 RX ORDER — BALANCED SALT SOLUTION 6.4; .75; .48; .3; 3.9; 1.7 MG/ML; MG/ML; MG/ML; MG/ML; MG/ML; MG/ML
SOLUTION OPHTHALMIC AS NEEDED
Status: DISCONTINUED | OUTPATIENT
Start: 2021-08-09 | End: 2021-08-09 | Stop reason: HOSPADM

## 2021-08-09 RX ORDER — MIDAZOLAM HYDROCHLORIDE 1 MG/ML
INJECTION INTRAMUSCULAR; INTRAVENOUS AS NEEDED
Status: DISCONTINUED | OUTPATIENT
Start: 2021-08-09 | End: 2021-08-09 | Stop reason: SURG

## 2021-08-09 RX ORDER — SODIUM CHLORIDE, SODIUM LACTATE, POTASSIUM CHLORIDE, CALCIUM CHLORIDE 600; 310; 30; 20 MG/100ML; MG/100ML; MG/100ML; MG/100ML
INJECTION, SOLUTION INTRAVENOUS CONTINUOUS
Status: DISCONTINUED | OUTPATIENT
Start: 2021-08-09 | End: 2021-08-09

## 2021-08-09 RX ORDER — NALOXONE HYDROCHLORIDE 0.4 MG/ML
80 INJECTION, SOLUTION INTRAMUSCULAR; INTRAVENOUS; SUBCUTANEOUS AS NEEDED
Status: DISCONTINUED | OUTPATIENT
Start: 2021-08-09 | End: 2021-08-09

## 2021-08-09 RX ORDER — PHENYLEPHRINE HCL 2.5 %
DROPS OPHTHALMIC (EYE)
Status: DISCONTINUED
Start: 2021-08-09 | End: 2021-08-09

## 2021-08-09 RX ORDER — TROPICAMIDE 10 MG/ML
SOLUTION/ DROPS OPHTHALMIC
Status: DISCONTINUED
Start: 2021-08-09 | End: 2021-08-09

## 2021-08-09 RX ORDER — TROPICAMIDE 10 MG/ML
1 SOLUTION/ DROPS OPHTHALMIC SEE ADMIN INSTRUCTIONS
Status: COMPLETED | OUTPATIENT
Start: 2021-08-09 | End: 2021-08-09

## 2021-08-09 RX ORDER — ACETAMINOPHEN 500 MG
500 TABLET ORAL EVERY 6 HOURS PRN
COMMUNITY

## 2021-08-09 RX ADMIN — MIDAZOLAM HYDROCHLORIDE 1 MG: 1 INJECTION INTRAMUSCULAR; INTRAVENOUS at 10:18:00

## 2021-08-09 RX ADMIN — MIDAZOLAM HYDROCHLORIDE 0.5 MG: 1 INJECTION INTRAMUSCULAR; INTRAVENOUS at 10:20:00

## 2021-08-09 NOTE — ANESTHESIA POSTPROCEDURE EVALUATION
[facility] Patient Status:  Hospital Outpatient Surgery   Age/Gender 74year old female MRN [id number]   [facility] SURGERY Attending Juan Luis Hayden MD   Hosp Day # 0 PCP Chon Baird MD       Anesthesia Post-op

## 2021-08-09 NOTE — OPERATIVE REPORT
DATE OF PROCEDURE: August 09, 2021    PRE OPERATIVE DIAGNOSIS: Combined age related cataract, right eye    POST OPERATIVE DIAGNOSIS: Combined age related cataract, right eye    PROCEDURE: Phacoemulsification with intraocular lens implant, right eye    SURG solution and the wounds were hydrated. The drapes and speculum were removed from the eye and Betadine solution was cleaned with sterile water. A drop of Moxifloxacin and Prednisolone was placed in the operative eye and the eye was shielded.      OPERATIVE F

## 2021-08-09 NOTE — H&P
The above referenced H&P by Dr Thurmond Cockayne on 8/2/21 was reviewed by Michel Krueger MD on 8/9/21, the patient was examined and no significant changes have occurred in the patient's condition since the H&P was performed.   I discussed with the patient and/or le

## 2021-09-01 ENCOUNTER — LABORATORY ENCOUNTER (OUTPATIENT)
Dept: LAB | Facility: HOSPITAL | Age: 66
End: 2021-09-01
Payer: MEDICARE

## 2021-09-01 DIAGNOSIS — H25.13 NUCLEAR SCLEROTIC CATARACT OF BOTH EYES: ICD-10-CM

## 2021-09-01 LAB
ALBUMIN SERPL-MCNC: 3.9 G/DL (ref 3.4–5)
ALBUMIN/GLOB SERPL: 1.1 {RATIO} (ref 1–2)
ALP LIVER SERPL-CCNC: 121 U/L
ALT SERPL-CCNC: 35 U/L
ANION GAP SERPL CALC-SCNC: 3 MMOL/L (ref 0–18)
AST SERPL-CCNC: 26 U/L (ref 15–37)
BILIRUB SERPL-MCNC: 0.5 MG/DL (ref 0.1–2)
BUN BLD-MCNC: 10 MG/DL (ref 7–18)
CALCIUM BLD-MCNC: 9.2 MG/DL (ref 8.5–10.1)
CHLORIDE SERPL-SCNC: 111 MMOL/L (ref 98–112)
CO2 SERPL-SCNC: 27 MMOL/L (ref 21–32)
CREAT BLD-MCNC: 0.73 MG/DL
GLOBULIN PLAS-MCNC: 3.7 G/DL (ref 2.8–4.4)
GLUCOSE BLD-MCNC: 160 MG/DL (ref 70–99)
M PROTEIN MFR SERPL ELPH: 7.6 G/DL (ref 6.4–8.2)
OSMOLALITY SERPL CALC.SUM OF ELEC: 294 MOSM/KG (ref 275–295)
PATIENT FASTING Y/N/NP: YES
POTASSIUM SERPL-SCNC: 4.1 MMOL/L (ref 3.5–5.1)
SODIUM SERPL-SCNC: 141 MMOL/L (ref 136–145)

## 2021-09-01 PROCEDURE — 80053 COMPREHEN METABOLIC PANEL: CPT

## 2021-09-01 PROCEDURE — 36415 COLL VENOUS BLD VENIPUNCTURE: CPT

## 2021-09-10 RX ORDER — SODIUM CHLORIDE, SODIUM LACTATE, POTASSIUM CHLORIDE, CALCIUM CHLORIDE 600; 310; 30; 20 MG/100ML; MG/100ML; MG/100ML; MG/100ML
INJECTION, SOLUTION INTRAVENOUS CONTINUOUS
Status: CANCELLED | OUTPATIENT
Start: 2021-09-10

## 2021-09-13 ENCOUNTER — ANESTHESIA (OUTPATIENT)
Dept: SURGERY | Facility: HOSPITAL | Age: 66
End: 2021-09-13
Payer: MEDICARE

## 2021-09-13 ENCOUNTER — HOSPITAL ENCOUNTER (OUTPATIENT)
Facility: HOSPITAL | Age: 66
Setting detail: HOSPITAL OUTPATIENT SURGERY
Discharge: HOME | End: 2021-09-13
Attending: OPHTHALMOLOGY | Admitting: OPHTHALMOLOGY
Payer: MEDICARE

## 2021-09-13 ENCOUNTER — ANESTHESIA EVENT (OUTPATIENT)
Dept: SURGERY | Facility: HOSPITAL | Age: 66
End: 2021-09-13
Payer: MEDICARE

## 2021-09-13 VITALS
HEIGHT: 63 IN | OXYGEN SATURATION: 95 % | RESPIRATION RATE: 16 BRPM | WEIGHT: 177.25 LBS | HEART RATE: 64 BPM | TEMPERATURE: 97 F | SYSTOLIC BLOOD PRESSURE: 108 MMHG | DIASTOLIC BLOOD PRESSURE: 43 MMHG | BODY MASS INDEX: 31.41 KG/M2

## 2021-09-13 DIAGNOSIS — Z01.818 PRE-OP TESTING: Primary | ICD-10-CM

## 2021-09-13 DIAGNOSIS — H25.12 NUCLEAR SCLEROTIC CATARACT OF LEFT EYE: ICD-10-CM

## 2021-09-13 LAB
GLUCOSE BLD-MCNC: 138 MG/DL (ref 70–99)
SARS-COV-2 RNA RESP QL NAA+PROBE: NOT DETECTED

## 2021-09-13 PROCEDURE — 82962 GLUCOSE BLOOD TEST: CPT

## 2021-09-13 PROCEDURE — 08RK3JZ REPLACEMENT OF LEFT LENS WITH SYNTHETIC SUBSTITUTE, PERCUTANEOUS APPROACH: ICD-10-PCS | Performed by: OPHTHALMOLOGY

## 2021-09-13 DEVICE — IMPLANTABLE DEVICE: Type: IMPLANTABLE DEVICE | Site: EYE | Status: FUNCTIONAL

## 2021-09-13 RX ORDER — TROPICAMIDE 10 MG/ML
1 SOLUTION/ DROPS OPHTHALMIC SEE ADMIN INSTRUCTIONS
Status: COMPLETED | OUTPATIENT
Start: 2021-09-13 | End: 2021-09-13

## 2021-09-13 RX ORDER — ONDANSETRON 2 MG/ML
4 INJECTION INTRAMUSCULAR; INTRAVENOUS AS NEEDED
Status: CANCELLED | OUTPATIENT
Start: 2021-09-13 | End: 2021-09-13

## 2021-09-13 RX ORDER — ACETAMINOPHEN 500 MG
1000 TABLET ORAL ONCE AS NEEDED
Status: CANCELLED | OUTPATIENT
Start: 2021-09-13 | End: 2021-09-13

## 2021-09-13 RX ORDER — PHENYLEPHRINE HCL 2.5 %
1 DROPS OPHTHALMIC (EYE) SEE ADMIN INSTRUCTIONS
Status: COMPLETED | OUTPATIENT
Start: 2021-09-13 | End: 2021-09-13

## 2021-09-13 RX ORDER — TETRACAINE HYDROCHLORIDE 5 MG/ML
1 SOLUTION OPHTHALMIC SEE ADMIN INSTRUCTIONS
Status: COMPLETED | OUTPATIENT
Start: 2021-09-13 | End: 2021-09-13

## 2021-09-13 RX ORDER — ACETAMINOPHEN 500 MG
TABLET ORAL
Status: COMPLETED
Start: 2021-09-13 | End: 2021-09-13

## 2021-09-13 RX ORDER — DEXTROSE MONOHYDRATE 25 G/50ML
50 INJECTION, SOLUTION INTRAVENOUS
Status: CANCELLED | OUTPATIENT
Start: 2021-09-13

## 2021-09-13 RX ORDER — HYDROCODONE BITARTRATE AND ACETAMINOPHEN 5; 325 MG/1; MG/1
2 TABLET ORAL AS NEEDED
Status: CANCELLED | OUTPATIENT
Start: 2021-09-13

## 2021-09-13 RX ORDER — SODIUM CHLORIDE, SODIUM LACTATE, POTASSIUM CHLORIDE, CALCIUM CHLORIDE 600; 310; 30; 20 MG/100ML; MG/100ML; MG/100ML; MG/100ML
INJECTION, SOLUTION INTRAVENOUS CONTINUOUS
Status: CANCELLED | OUTPATIENT
Start: 2021-09-13

## 2021-09-13 RX ORDER — LIDOCAINE HYDROCHLORIDE 10 MG/ML
INJECTION, SOLUTION EPIDURAL; INFILTRATION; INTRACAUDAL; PERINEURAL AS NEEDED
Status: DISCONTINUED | OUTPATIENT
Start: 2021-09-13 | End: 2021-09-13 | Stop reason: HOSPADM

## 2021-09-13 RX ORDER — PREDNISOLONE ACETATE 10 MG/ML
SUSPENSION/ DROPS OPHTHALMIC AS NEEDED
Status: DISCONTINUED | OUTPATIENT
Start: 2021-09-13 | End: 2021-09-13 | Stop reason: HOSPADM

## 2021-09-13 RX ORDER — DEXTROSE MONOHYDRATE 25 G/50ML
50 INJECTION, SOLUTION INTRAVENOUS
Status: DISCONTINUED | OUTPATIENT
Start: 2021-09-13 | End: 2021-09-13 | Stop reason: HOSPADM

## 2021-09-13 RX ORDER — MIDAZOLAM HYDROCHLORIDE 1 MG/ML
INJECTION INTRAMUSCULAR; INTRAVENOUS AS NEEDED
Status: DISCONTINUED | OUTPATIENT
Start: 2021-09-13 | End: 2021-09-13 | Stop reason: SURG

## 2021-09-13 RX ORDER — BALANCED SALT SOLUTION 6.4; .75; .48; .3; 3.9; 1.7 MG/ML; MG/ML; MG/ML; MG/ML; MG/ML; MG/ML
SOLUTION OPHTHALMIC AS NEEDED
Status: DISCONTINUED | OUTPATIENT
Start: 2021-09-13 | End: 2021-09-13 | Stop reason: HOSPADM

## 2021-09-13 RX ORDER — MOXIFLOXACIN 5 MG/ML
SOLUTION/ DROPS OPHTHALMIC AS NEEDED
Status: DISCONTINUED | OUTPATIENT
Start: 2021-09-13 | End: 2021-09-13 | Stop reason: HOSPADM

## 2021-09-13 RX ORDER — HYDROCODONE BITARTRATE AND ACETAMINOPHEN 5; 325 MG/1; MG/1
1 TABLET ORAL AS NEEDED
Status: CANCELLED | OUTPATIENT
Start: 2021-09-13

## 2021-09-13 RX ORDER — TETRACAINE HYDROCHLORIDE 5 MG/ML
SOLUTION OPHTHALMIC AS NEEDED
Status: DISCONTINUED | OUTPATIENT
Start: 2021-09-13 | End: 2021-09-13 | Stop reason: HOSPADM

## 2021-09-13 RX ORDER — CYCLOPENTOLATE HYDROCHLORIDE 10 MG/ML
1 SOLUTION/ DROPS OPHTHALMIC SEE ADMIN INSTRUCTIONS
Status: COMPLETED | OUTPATIENT
Start: 2021-09-13 | End: 2021-09-13

## 2021-09-13 RX ORDER — NALOXONE HYDROCHLORIDE 0.4 MG/ML
80 INJECTION, SOLUTION INTRAMUSCULAR; INTRAVENOUS; SUBCUTANEOUS AS NEEDED
Status: CANCELLED | OUTPATIENT
Start: 2021-09-13 | End: 2021-09-13

## 2021-09-13 RX ORDER — SODIUM CHLORIDE, SODIUM LACTATE, POTASSIUM CHLORIDE, CALCIUM CHLORIDE 600; 310; 30; 20 MG/100ML; MG/100ML; MG/100ML; MG/100ML
INJECTION, SOLUTION INTRAVENOUS CONTINUOUS
Status: DISCONTINUED | OUTPATIENT
Start: 2021-09-13 | End: 2021-09-13

## 2021-09-13 RX ORDER — HYDROMORPHONE HYDROCHLORIDE 1 MG/ML
0.4 INJECTION, SOLUTION INTRAMUSCULAR; INTRAVENOUS; SUBCUTANEOUS EVERY 5 MIN PRN
Status: CANCELLED | OUTPATIENT
Start: 2021-09-13 | End: 2021-09-13

## 2021-09-13 RX ADMIN — MIDAZOLAM HYDROCHLORIDE 2 MG: 1 INJECTION INTRAMUSCULAR; INTRAVENOUS at 08:33:00

## 2021-09-13 NOTE — ANESTHESIA POSTPROCEDURE EVALUATION
[facility] Patient Status:  Hospital Outpatient Surgery   Age/Gender 74year old female MRN [id number]   Location [address] [place] [address] Attending Kiran Guy MD   [address] Day # 0 PCP Dafne Nava MD

## 2021-09-13 NOTE — H&P
History & Physical Examination    Patient Name: Aashish Carrera  MRN: [id number]  CSN: [id number]  YOB: 1955    Diagnosis: visually significant cataract left eye    Present Illness: 73 yo female with blurred vision found to have catarac 9/12/2021 at Unknown time  Accu-Chek Softclix Lancets Does not apply Misc, Use to test blood glucose daily. , Disp: 100 each, Rfl: 0, 9/12/2021 at Unknown time  Glucose Blood (ACCU-CHEK GIANA) In Vitro Strip, Use one strip to test blood sugar once daily.   D essential hypertension    • Visual impairment     glasses/contact lenses     Past Surgical History:   Procedure Laterality Date   • APPENDECTOMY  10-23-12 [facility]   • BIOPSY OF BREAST, INCISIONAL     •      • COLONOSCOPY  19= Diverticul and Physical done within the last 30 days. Any changes noted above.     Merly Reddy MD  9/13/2021  7:24 AM

## 2021-09-13 NOTE — OPERATIVE REPORT
DATE OF PROCEDURE: 09/13/21    PRE OPERATIVE OP DIAGNOSIS: Combined age related cataract, left eye    POST OPERATIVE DIAGNOSIS: Combined age related cataract, left eye    PROCEDURE: Phacoemulsification with intraocular lens implant, left eye    SURGEON: Nahid and the wounds were hydrated. The drapes and speculum were removed from the eye and Betadine solution was cleaned with sterile water. A drop of Moxifloxacin and Prednisolone was placed in the operative eye and the eye was shielded.      OPERATIVE FINDINGS:

## 2021-09-13 NOTE — ANESTHESIA PREPROCEDURE EVALUATION
PRE-OP EVALUATION    Patient Name: Erica Lindsey    Admit Diagnosis: Nuclear sclerotic cataract of left eye [H25.12]    Pre-op Diagnosis: Nuclear sclerotic cataract of left eye [H25.12]    PHACOEMULSIFICATION OF LEFT CATARACT WITH PLACEMENT OF IN reviewed.   Exercise tolerance: good     MET: >4    (+) obesity  (+) hypertension                       (-) angina     (-) CORRAL  (-) orthopnea       Endo/Other      (+) diabetes  type 2,                          Pulmonary      (-) asthma         (-) tanisha 09/01/2021    CO2 23.9 08/02/2021    BUN 10 09/01/2021    BUN 11.0 08/02/2021    CREATSERUM 0.73 09/01/2021    CREATSERUM 0.51 08/02/2021     (H) 09/01/2021     (H) 08/02/2021    CA 9.2 09/01/2021    CA 9.4 08/02/2021            Airway      M

## 2022-06-29 ENCOUNTER — LAB ENCOUNTER (OUTPATIENT)
Dept: LAB | Age: 67
End: 2022-06-29
Attending: ORTHOPAEDIC SURGERY
Payer: MEDICARE

## 2022-06-29 DIAGNOSIS — Z01.818 PREOP TESTING: ICD-10-CM

## 2022-06-29 LAB — SARS-COV-2 RNA RESP QL NAA+PROBE: NOT DETECTED

## 2022-07-01 ENCOUNTER — HOSPITAL ENCOUNTER (OUTPATIENT)
Facility: HOSPITAL | Age: 67
Setting detail: HOSPITAL OUTPATIENT SURGERY
Discharge: HOME | End: 2022-07-01
Attending: ORTHOPAEDIC SURGERY | Admitting: ORTHOPAEDIC SURGERY
Payer: MEDICARE

## 2022-07-01 ENCOUNTER — APPOINTMENT (OUTPATIENT)
Dept: LAB | Facility: HOSPITAL | Age: 67
End: 2022-07-01
Attending: ORTHOPAEDIC SURGERY
Payer: MEDICARE

## 2022-07-01 ENCOUNTER — ANESTHESIA (OUTPATIENT)
Dept: SURGERY | Facility: HOSPITAL | Age: 67
End: 2022-07-01
Payer: MEDICARE

## 2022-07-01 ENCOUNTER — ANESTHESIA EVENT (OUTPATIENT)
Dept: SURGERY | Facility: HOSPITAL | Age: 67
End: 2022-07-01
Payer: MEDICARE

## 2022-07-01 ENCOUNTER — HOSPITAL ENCOUNTER (OUTPATIENT)
Facility: HOSPITAL | Age: 67
Discharge: HOME | End: 2022-07-01
Attending: ORTHOPAEDIC SURGERY | Admitting: ORTHOPAEDIC SURGERY
Payer: MEDICARE

## 2022-07-01 VITALS
HEART RATE: 88 BPM | BODY MASS INDEX: 36.01 KG/M2 | HEIGHT: 62 IN | TEMPERATURE: 97 F | RESPIRATION RATE: 18 BRPM | OXYGEN SATURATION: 93 % | SYSTOLIC BLOOD PRESSURE: 133 MMHG | DIASTOLIC BLOOD PRESSURE: 77 MMHG | WEIGHT: 195.69 LBS

## 2022-07-01 DIAGNOSIS — Z01.818 PREOP TESTING: Primary | ICD-10-CM

## 2022-07-01 LAB
GLUCOSE BLDC GLUCOMTR-MCNC: 193 MG/DL (ref 70–99)
GLUCOSE BLDC GLUCOMTR-MCNC: 220 MG/DL (ref 70–99)

## 2022-07-01 PROCEDURE — 0RHK44Z INSERTION OF INTERNAL FIXATION DEVICE INTO LEFT SHOULDER JOINT, PERCUTANEOUS ENDOSCOPIC APPROACH: ICD-10-PCS | Performed by: ORTHOPAEDIC SURGERY

## 2022-07-01 PROCEDURE — 0RNK4ZZ RELEASE LEFT SHOULDER JOINT, PERCUTANEOUS ENDOSCOPIC APPROACH: ICD-10-PCS | Performed by: ORTHOPAEDIC SURGERY

## 2022-07-01 PROCEDURE — 82962 GLUCOSE BLOOD TEST: CPT

## 2022-07-01 PROCEDURE — 0LM24ZZ REATTACHMENT OF LEFT SHOULDER TENDON, PERCUTANEOUS ENDOSCOPIC APPROACH: ICD-10-PCS | Performed by: ORTHOPAEDIC SURGERY

## 2022-07-01 PROCEDURE — 0RBK4ZZ EXCISION OF LEFT SHOULDER JOINT, PERCUTANEOUS ENDOSCOPIC APPROACH: ICD-10-PCS | Performed by: ORTHOPAEDIC SURGERY

## 2022-07-01 PROCEDURE — 0PBB4ZZ EXCISION OF LEFT CLAVICLE, PERCUTANEOUS ENDOSCOPIC APPROACH: ICD-10-PCS | Performed by: ORTHOPAEDIC SURGERY

## 2022-07-01 PROCEDURE — 0LS44ZZ REPOSITION LEFT UPPER ARM TENDON, PERCUTANEOUS ENDOSCOPIC APPROACH: ICD-10-PCS | Performed by: ORTHOPAEDIC SURGERY

## 2022-07-01 PROCEDURE — 76942 ECHO GUIDE FOR BIOPSY: CPT | Performed by: ORTHOPAEDIC SURGERY

## 2022-07-01 PROCEDURE — 64415 NJX AA&/STRD BRCH PLXS IMG: CPT | Performed by: ORTHOPAEDIC SURGERY

## 2022-07-01 DEVICE — IMPLANTABLE DEVICE: Type: IMPLANTABLE DEVICE | Site: SHOULDER | Status: FUNCTIONAL

## 2022-07-01 DEVICE — ANCHR/SCREW SWIVELCK PEEK 4.75: Type: IMPLANTABLE DEVICE | Site: SHOULDER | Status: FUNCTIONAL

## 2022-07-01 RX ORDER — SODIUM CHLORIDE, SODIUM LACTATE, POTASSIUM CHLORIDE, CALCIUM CHLORIDE 600; 310; 30; 20 MG/100ML; MG/100ML; MG/100ML; MG/100ML
INJECTION, SOLUTION INTRAVENOUS CONTINUOUS
Status: DISCONTINUED | OUTPATIENT
Start: 2022-07-01 | End: 2022-07-01

## 2022-07-01 RX ORDER — ROCURONIUM BROMIDE 10 MG/ML
INJECTION, SOLUTION INTRAVENOUS AS NEEDED
Status: DISCONTINUED | OUTPATIENT
Start: 2022-07-01 | End: 2022-07-01 | Stop reason: SURG

## 2022-07-01 RX ORDER — ONDANSETRON 2 MG/ML
INJECTION INTRAMUSCULAR; INTRAVENOUS AS NEEDED
Status: DISCONTINUED | OUTPATIENT
Start: 2022-07-01 | End: 2022-07-01 | Stop reason: SURG

## 2022-07-01 RX ORDER — INSULIN ASPART 100 [IU]/ML
INJECTION, SOLUTION INTRAVENOUS; SUBCUTANEOUS ONCE
Status: COMPLETED | OUTPATIENT
Start: 2022-07-01 | End: 2022-07-01

## 2022-07-01 RX ORDER — ROPIVACAINE HYDROCHLORIDE 5 MG/ML
INJECTION, SOLUTION EPIDURAL; INFILTRATION; PERINEURAL AS NEEDED
Status: DISCONTINUED | OUTPATIENT
Start: 2022-07-01 | End: 2022-07-01 | Stop reason: SURG

## 2022-07-01 RX ORDER — METOPROLOL TARTRATE 5 MG/5ML
2.5 INJECTION INTRAVENOUS ONCE
Status: DISCONTINUED | OUTPATIENT
Start: 2022-07-01 | End: 2022-07-01

## 2022-07-01 RX ORDER — FAMOTIDINE 20 MG/1
20 TABLET, FILM COATED ORAL ONCE
Status: COMPLETED | OUTPATIENT
Start: 2022-07-01 | End: 2022-07-01

## 2022-07-01 RX ORDER — VANCOMYCIN HYDROCHLORIDE
15 ONCE
Status: COMPLETED | OUTPATIENT
Start: 2022-07-01 | End: 2022-07-01

## 2022-07-01 RX ORDER — MORPHINE SULFATE 10 MG/ML
6 INJECTION, SOLUTION INTRAMUSCULAR; INTRAVENOUS EVERY 10 MIN PRN
Status: DISCONTINUED | OUTPATIENT
Start: 2022-07-01 | End: 2022-07-01

## 2022-07-01 RX ORDER — NICOTINE POLACRILEX 4 MG
15 LOZENGE BUCCAL
Status: DISCONTINUED | OUTPATIENT
Start: 2022-07-01 | End: 2022-07-01

## 2022-07-01 RX ORDER — MIDAZOLAM HYDROCHLORIDE 1 MG/ML
INJECTION INTRAMUSCULAR; INTRAVENOUS AS NEEDED
Status: DISCONTINUED | OUTPATIENT
Start: 2022-07-01 | End: 2022-07-01 | Stop reason: SURG

## 2022-07-01 RX ORDER — MORPHINE SULFATE 4 MG/ML
4 INJECTION, SOLUTION INTRAMUSCULAR; INTRAVENOUS EVERY 10 MIN PRN
Status: DISCONTINUED | OUTPATIENT
Start: 2022-07-01 | End: 2022-07-01

## 2022-07-01 RX ORDER — NALOXONE HYDROCHLORIDE 0.4 MG/ML
80 INJECTION, SOLUTION INTRAMUSCULAR; INTRAVENOUS; SUBCUTANEOUS AS NEEDED
Status: DISCONTINUED | OUTPATIENT
Start: 2022-07-01 | End: 2022-07-01

## 2022-07-01 RX ORDER — LIDOCAINE HYDROCHLORIDE 10 MG/ML
INJECTION, SOLUTION EPIDURAL; INFILTRATION; INTRACAUDAL; PERINEURAL AS NEEDED
Status: DISCONTINUED | OUTPATIENT
Start: 2022-07-01 | End: 2022-07-01 | Stop reason: SURG

## 2022-07-01 RX ORDER — DEXAMETHASONE SODIUM PHOSPHATE 10 MG/ML
INJECTION, SOLUTION INTRAMUSCULAR; INTRAVENOUS AS NEEDED
Status: DISCONTINUED | OUTPATIENT
Start: 2022-07-01 | End: 2022-07-01 | Stop reason: SURG

## 2022-07-01 RX ORDER — HYDROMORPHONE HYDROCHLORIDE 1 MG/ML
0.4 INJECTION, SOLUTION INTRAMUSCULAR; INTRAVENOUS; SUBCUTANEOUS EVERY 5 MIN PRN
Status: DISCONTINUED | OUTPATIENT
Start: 2022-07-01 | End: 2022-07-01

## 2022-07-01 RX ORDER — EPHEDRINE SULFATE 50 MG/ML
INJECTION, SOLUTION INTRAVENOUS AS NEEDED
Status: DISCONTINUED | OUTPATIENT
Start: 2022-07-01 | End: 2022-07-01 | Stop reason: SURG

## 2022-07-01 RX ORDER — DEXAMETHASONE SODIUM PHOSPHATE 4 MG/ML
VIAL (ML) INJECTION AS NEEDED
Status: DISCONTINUED | OUTPATIENT
Start: 2022-07-01 | End: 2022-07-01 | Stop reason: SURG

## 2022-07-01 RX ORDER — HYDROMORPHONE HYDROCHLORIDE 1 MG/ML
0.6 INJECTION, SOLUTION INTRAMUSCULAR; INTRAVENOUS; SUBCUTANEOUS EVERY 5 MIN PRN
Status: DISCONTINUED | OUTPATIENT
Start: 2022-07-01 | End: 2022-07-01

## 2022-07-01 RX ORDER — NICOTINE POLACRILEX 4 MG
30 LOZENGE BUCCAL
Status: DISCONTINUED | OUTPATIENT
Start: 2022-07-01 | End: 2022-07-01

## 2022-07-01 RX ORDER — DEXTROSE MONOHYDRATE 25 G/50ML
50 INJECTION, SOLUTION INTRAVENOUS
Status: DISCONTINUED | OUTPATIENT
Start: 2022-07-01 | End: 2022-07-01

## 2022-07-01 RX ORDER — HYDROMORPHONE HYDROCHLORIDE 1 MG/ML
0.2 INJECTION, SOLUTION INTRAMUSCULAR; INTRAVENOUS; SUBCUTANEOUS EVERY 5 MIN PRN
Status: DISCONTINUED | OUTPATIENT
Start: 2022-07-01 | End: 2022-07-01

## 2022-07-01 RX ORDER — MORPHINE SULFATE 4 MG/ML
2 INJECTION, SOLUTION INTRAMUSCULAR; INTRAVENOUS EVERY 10 MIN PRN
Status: DISCONTINUED | OUTPATIENT
Start: 2022-07-01 | End: 2022-07-01

## 2022-07-01 RX ORDER — ACETAMINOPHEN 500 MG
1000 TABLET ORAL ONCE
Status: COMPLETED | OUTPATIENT
Start: 2022-07-01 | End: 2022-07-01

## 2022-07-01 RX ADMIN — SODIUM CHLORIDE, SODIUM LACTATE, POTASSIUM CHLORIDE, CALCIUM CHLORIDE: 600; 310; 30; 20 INJECTION, SOLUTION INTRAVENOUS at 18:41:00

## 2022-07-01 RX ADMIN — EPHEDRINE SULFATE 5 MG: 50 INJECTION, SOLUTION INTRAVENOUS at 16:41:00

## 2022-07-01 RX ADMIN — LIDOCAINE HYDROCHLORIDE 2 ML: 10 INJECTION, SOLUTION EPIDURAL; INFILTRATION; INTRACAUDAL; PERINEURAL at 15:48:00

## 2022-07-01 RX ADMIN — ROCURONIUM BROMIDE 40 MG: 10 INJECTION, SOLUTION INTRAVENOUS at 16:24:00

## 2022-07-01 RX ADMIN — MIDAZOLAM HYDROCHLORIDE 2 MG: 1 INJECTION INTRAMUSCULAR; INTRAVENOUS at 15:48:00

## 2022-07-01 RX ADMIN — EPHEDRINE SULFATE 5 MG: 50 INJECTION, SOLUTION INTRAVENOUS at 16:46:00

## 2022-07-01 RX ADMIN — DEXAMETHASONE SODIUM PHOSPHATE 4 MG: 10 INJECTION, SOLUTION INTRAMUSCULAR; INTRAVENOUS at 15:52:00

## 2022-07-01 RX ADMIN — DEXAMETHASONE SODIUM PHOSPHATE 4 MG: 4 MG/ML VIAL (ML) INJECTION at 16:44:00

## 2022-07-01 RX ADMIN — EPHEDRINE SULFATE 5 MG: 50 INJECTION, SOLUTION INTRAVENOUS at 16:51:00

## 2022-07-01 RX ADMIN — LIDOCAINE HYDROCHLORIDE 50 MG: 10 INJECTION, SOLUTION EPIDURAL; INFILTRATION; INTRACAUDAL; PERINEURAL at 16:22:00

## 2022-07-01 RX ADMIN — ONDANSETRON 4 MG: 2 INJECTION INTRAMUSCULAR; INTRAVENOUS at 16:44:00

## 2022-07-01 RX ADMIN — SODIUM CHLORIDE, SODIUM LACTATE, POTASSIUM CHLORIDE, CALCIUM CHLORIDE: 600; 310; 30; 20 INJECTION, SOLUTION INTRAVENOUS at 19:10:00

## 2022-07-01 RX ADMIN — ROPIVACAINE HYDROCHLORIDE 30 ML: 5 INJECTION, SOLUTION EPIDURAL; INFILTRATION; PERINEURAL at 15:52:00

## 2022-07-01 NOTE — ANESTHESIA PROCEDURE NOTES
Airway  Date/Time: 7/1/2022 4:26 PM  Urgency: Elective    Airway not difficult    General Information and Staff    Patient location during procedure: OR  Anesthesiologist: Telma Stein MD  Resident/CRNA: Yolanda Starr CRNA  Performed: CRNA     Indications and Patient Condition  Indications for airway management: anesthesia  Sedation level: deep  Preoxygenated: yes  Patient position: sniffing  Mask difficulty assessment: 2 - vent by mask + OA or adjuvant +/- NMBA    Final Airway Details  Final airway type: endotracheal airway      Successful airway: ETT  Cuffed: yes   Successful intubation technique: direct laryngoscopy  Facilitating devices/methods: intubating stylet  Endotracheal tube insertion site: oral  Blade: Slim  Blade size: #3  ETT size (mm): 7.0    Cormack-Lehane Classification: grade IIA - partial view of glottis  Placement verified by: chest auscultation and capnometry   Measured from: teeth  ETT to teeth (cm): 21  Number of attempts at approach: 1

## 2022-07-01 NOTE — ANESTHESIA PROCEDURE NOTES
Peripheral Block  Performed by: Gonzales Vasquez MD  Authorized by: Gonzales Vasquez MD       General Information and Staff    Start Time:  7/1/2022 3:48 PM  End Time:  7/1/2022 3:53 PM  Anesthesiologist:  Gonzales Vasquez MD  Performed by: Anesthesiologist  Patient Location:  PACU    Block Placement: Pre Induction  Site Identification: real time ultrasound guided, nerve stimulator and image stored and retrievable      Reason for Block: at surgeon's request and post-op pain management    Preanesthetic Checklist: 2 patient identifers, IV checked, risks and benefits discussed, monitors and equipment checked, pre-op evaluation, timeout performed, anesthesia consent, sterile technique used, no prohibitive neurological deficits and no local skin infection at insertion site      Procedure Details    Patient Position:  Sitting  Prep: ChloraPrep and patient draped    Monitoring:  Cardiac monitor, heart rate, continuous pulse ox and blood pressure cuff  Block Type: Interscalene  Laterality:  Left  Injection Technique:  Single-shot    Needle    Needle Type:  Short-bevel  Needle Gauge:  22 G  Needle Length:  50 mm  Needle Localization:  Ultrasound guidance and nerve stimulator  Reason for Ultrasound Use: appropriate spread of the medication was noted in real time and no ultrasound evidence of intravascular and/or intraneural injection            Assessment    Injection Assessment:  Good spread noted, incremental injection, local visualized surrounding nerve on ultrasound, low pressure, negative aspiration for heme, no pain on injection and negative resistance  Paresthesia Pain:  None  Heart Rate Change: No    - Patient tolerated block procedure well without evidence of immediate block related complications.      Medications      Additional Comments

## 2022-07-01 NOTE — OPERATIVE REPORT
Operative Report      DATE: 07/01/22    PATIENT'S PREOPERATIVE DIAGNOSIS:    1. Left Shoulder Rotator Cuff Tear  2. Left Subacromial Impingement  3. Left Shoulder Biceps Tendonitis  4. Left Shoulder AC Joint Arthritis     PATIENT'S POSTOPERATIVE DIAGNOSIS:  1. Left Shoulder Rotator Cuff Tear  2. Left Subacromial Impingement  3. Left Shoulder Biceps Tendonitis  4. Left Shoulder AC Joint Arthritis     THE OPERATION:  1. Left Shoulder Rotator Cuff Repair (00324)  2. Left Shoulder Subacromial Decompression with Acromioplasty (10278)  3. Left Shoulder Extensive Debridement (62152)  4. Left Shoulder Arthroscopic Biceps Tenodesis (48142)  5. Left Shoulder Arthroscopic [facility] Joint Resection (25183)     SURGEON:  Cathy Mack MD    FIRST ASSISTANT: INEZ Daley  First assist was necessary for positioning of the patient, manipulating tissue, holding the camera, and implanting implants during the procedure. COMPLICATIONS:  None     ESTIMATED BLOOD LOSS:  30cc     ANESTHESIA: Interscalene Block and General     IMPLANTS:    Implant Name Type Inv.  Item Serial No.  Lot No. LRB No. Used Action   ANCHR/SCREW SWIVELCK PEEK 4.75 - SN/A  ANCHR/SCREW SWIVELCK PEEK 4.75 N/A [employer] [id number] Left 1 Implanted   peek swivelock   N/A [employer] [id number] Left 1 Implanted   ANCHOR SUTURE CORK 4.75MM - SN/A  ANCHOR SUTURE CORK 4.75MM N/A [employer] [id number] Left 1 Implanted         FINDINGS:   Biceps tendon - Significant interarticular hyperemia  Rotator cuff - Significant tendinosis with small to medium sized high grade partial thickness supraspinatus tear and significant tendinosis/intra-substance tearing throughout  Labrum - Significant Type 1 Fraying/Tearing  Humeral Head - Grade Wear  Glenoid - Grade 2-3 Wear      THE OPERATION IS AS FOLLOWS:    Prior to being brought into the operating room the patient was met in the pre-operative holding area where all questions were answered and the patient's operative extremity was signed. Risks, benefits, alternative surgery discussed with the patient again and sheunderstood the risks and agreed to proceed with surgery. Risk discussed include bleeding, infection, stiffness, nerve damage, continued pain after surgery, need for future surgery, and failure of any tenodesis or repair that is performed. The patient was then brought in the operating room and placed in the beach chair position on the operating room table. After the smooth induction of anesthesia by the anesthesia team the operative upper extremity was prepped and draped in the usual sterile fashion. Prior to the operative procedure a time out was performed per hospital protocol. Following time out a standard posterior arthroscopic viewing portal and anterior arthroscopic working portal were made. A diagnostic arthroscopy was performed with findings noted above. A synovectomy was performed and all labral fraying was removed using the arthroscopic shaver and [employer]. This included removing significant frayed labral tissue in the anterior and posterior labrum. A chondroplasty of the humeral head and glenoid was also performed using the arthroscopic shaver. An arthroscopic biceps tenodesis was then performed. A labral scorpion was then used to pass a suture link suture through and imbricate the biceps tendon. The tendon was then release from the labrum. One swivel lock anchor was then attached to the suture link suture and used to perform the biceps tendonesis at the superior extent of the biceps tunnel. Images were taken of the tenodesis. The subacromial space was entered and a lateral portal was established. A complete bursectomy was performed using the arthroscopic shaver and an [employer]Care. Care was taken to recreate the anterior, lateral, and posterior gutters and sure all hyperemic and thick bursal tissue was excised. The rotator cuff tear was then inspected.   Details of the tear listed above under findings. A double loaded loaded [employer]rex corkscrew anchor was placed along the articular margin of the humerus. Using the express sew device all 4 sutures were passed through the tendon in a mattress configuration. The Sutures were then tied in place. One Swivel Lock from [employer] was placed  long the lateral aspect of the greater tuberosity and used to secure the lateral margin of the rotator cuff tendon to the humerus. The repair was probed and was found to be tight with restoration of the normal rotator cuff tension onto the anatomic footprint. The acromioplasty was then performed. This was performed first by removing all synovial tissue off the undersurface of the acromion using the ArthroCare device. This is followed by using a 5.0 mm resector to remove  5-7 mm of bone from the undersurface of the acromion and changing the acromial morphology to a type I morphology. Care was taken to ensure that this acromioplasty was continued all the way to the lateral edge of the acromioclavicular joint. Subsequent to resection of the medial inferior acromion, the acromioclavicular joint was visualized. The distal clavicle was misshapen with inferior osteophytes and hypertrophy consistent with preoperative imaging. A distal clavicle resection was performed arthroscopically with a 5.5 mm shaver and juma. The arthroscope was placed in the lateral portal with a 5.5 mm shaver/juma placed into the anterior accessory portal. The 5.5 mm shaver/juma was used to resect approximately 8-10 mm of bone off the distal clavicle. This created space between the medial acromion and distal clavicle which had not existed before. Care was taken to preserve the superior acromioclavicular joint ligaments. Closure was then began. Closure was began by removing the camera from the shoulder and draining all excess arthroscopic fluid from the shoulder using manual pressure.   This is followed by using 3-0 vicryl for the subcutaneous tissue and and 3-0 nylon sutures for the skin. Dressings were placed followed by shoulder immobilizer. The  patient was transferred from operating room to recovery room in stable condition. At the conclusion of the procedure all counts were performed and were correct.

## 2022-07-02 ENCOUNTER — APPOINTMENT (OUTPATIENT)
Dept: CT IMAGING | Facility: HOSPITAL | Age: 67
End: 2022-07-02
Attending: EMERGENCY MEDICINE
Payer: MEDICARE

## 2022-07-02 ENCOUNTER — APPOINTMENT (OUTPATIENT)
Dept: CV DIAGNOSTICS | Facility: HOSPITAL | Age: 67
End: 2022-07-02
Attending: HOSPITALIST
Payer: MEDICARE

## 2022-07-02 ENCOUNTER — HOSPITAL ENCOUNTER (INPATIENT)
Facility: HOSPITAL | Age: 67
LOS: 1 days | Discharge: HOME | End: 2022-07-03
Attending: EMERGENCY MEDICINE | Admitting: HOSPITALIST
Payer: MEDICARE

## 2022-07-02 ENCOUNTER — APPOINTMENT (OUTPATIENT)
Dept: ULTRASOUND IMAGING | Facility: HOSPITAL | Age: 67
End: 2022-07-02
Attending: HOSPITALIST
Payer: MEDICARE

## 2022-07-02 ENCOUNTER — APPOINTMENT (OUTPATIENT)
Dept: GENERAL RADIOLOGY | Facility: HOSPITAL | Age: 67
End: 2022-07-02
Attending: EMERGENCY MEDICINE
Payer: MEDICARE

## 2022-07-02 ENCOUNTER — HOSPITAL ENCOUNTER (OUTPATIENT)
Facility: HOSPITAL | Age: 67
Setting detail: OBSERVATION
Discharge: HOME | End: 2022-07-03
Attending: EMERGENCY MEDICINE | Admitting: HOSPITALIST
Payer: MEDICARE

## 2022-07-02 DIAGNOSIS — J96.01 ACUTE RESPIRATORY FAILURE WITH HYPOXIA (HCC): ICD-10-CM

## 2022-07-02 DIAGNOSIS — E11.65 TYPE 2 DIABETES MELLITUS WITH HYPERGLYCEMIA, WITHOUT LONG-TERM CURRENT USE OF INSULIN (HCC): ICD-10-CM

## 2022-07-02 DIAGNOSIS — I26.93 SINGLE SUBSEGMENTAL PULMONARY EMBOLISM WITHOUT ACUTE COR PULMONALE (HCC): Primary | ICD-10-CM

## 2022-07-02 LAB
ALBUMIN SERPL-MCNC: 3.7 G/DL (ref 3.4–5)
ALP LIVER SERPL-CCNC: 114 U/L
ALT SERPL-CCNC: 39 U/L
ANION GAP SERPL CALC-SCNC: 8 MMOL/L (ref 0–18)
APTT PPP: 231.4 SECONDS (ref 23.3–35.6)
APTT PPP: 30.3 SECONDS (ref 23.3–35.6)
APTT PPP: 85.8 SECONDS (ref 23.3–35.6)
AST SERPL-CCNC: 33 U/L (ref 15–37)
B-HCG UR QL: NEGATIVE
BASOPHILS # BLD AUTO: 0.04 X10(3) UL (ref 0–0.2)
BASOPHILS NFR BLD AUTO: 0.4 %
BILIRUB DIRECT SERPL-MCNC: 0.1 MG/DL (ref 0–0.2)
BILIRUB SERPL-MCNC: 0.4 MG/DL (ref 0.1–2)
BILIRUB UR QL: NEGATIVE
BUN BLD-MCNC: 14 MG/DL (ref 7–18)
BUN/CREAT SERPL: 12.8 (ref 10–20)
CALCIUM BLD-MCNC: 8.5 MG/DL (ref 8.5–10.1)
CHLORIDE SERPL-SCNC: 110 MMOL/L (ref 98–112)
CLARITY UR: CLEAR
CO2 SERPL-SCNC: 24 MMOL/L (ref 21–32)
COLOR UR: YELLOW
CREAT BLD-MCNC: 1.09 MG/DL
D DIMER PPP FEU-MCNC: 1.36 UG/ML FEU (ref ?–0.68)
DEPRECATED RDW RBC AUTO: 48.5 FL (ref 35.1–46.3)
EOSINOPHIL # BLD AUTO: 0 X10(3) UL (ref 0–0.7)
EOSINOPHIL NFR BLD AUTO: 0 %
ERYTHROCYTE [DISTWIDTH] IN BLOOD BY AUTOMATED COUNT: 13.7 % (ref 11–15)
EST. AVERAGE GLUCOSE BLD GHB EST-MCNC: 206 MG/DL (ref 68–126)
GLUCOSE BLD-MCNC: 422 MG/DL (ref 70–99)
GLUCOSE BLDC GLUCOMTR-MCNC: 207 MG/DL (ref 70–99)
GLUCOSE BLDC GLUCOMTR-MCNC: 223 MG/DL (ref 70–99)
GLUCOSE BLDC GLUCOMTR-MCNC: 284 MG/DL (ref 70–99)
GLUCOSE BLDC GLUCOMTR-MCNC: 300 MG/DL (ref 70–99)
GLUCOSE UR-MCNC: >=1000 MG/DL
HBA1C MFR BLD: 8.8 % (ref ?–5.7)
HCT VFR BLD AUTO: 38.8 %
HGB BLD-MCNC: 12.4 G/DL
IMM GRANULOCYTES # BLD AUTO: 0.03 X10(3) UL (ref 0–1)
IMM GRANULOCYTES NFR BLD: 0.3 %
KETONES UR-MCNC: NEGATIVE MG/DL
LEUKOCYTE ESTERASE UR QL STRIP.AUTO: NEGATIVE
LYMPHOCYTES # BLD AUTO: 0.94 X10(3) UL (ref 1–4)
LYMPHOCYTES NFR BLD AUTO: 10.1 %
MCH RBC QN AUTO: 30.7 PG (ref 26–34)
MCHC RBC AUTO-ENTMCNC: 32 G/DL (ref 31–37)
MCV RBC AUTO: 96 FL
MONOCYTES # BLD AUTO: 0.43 X10(3) UL (ref 0.1–1)
MONOCYTES NFR BLD AUTO: 4.6 %
NEUTROPHILS # BLD AUTO: 7.91 X10 (3) UL (ref 1.5–7.7)
NEUTROPHILS # BLD AUTO: 7.91 X10(3) UL (ref 1.5–7.7)
NEUTROPHILS NFR BLD AUTO: 84.6 %
NITRITE UR QL STRIP.AUTO: NEGATIVE
NT-PROBNP SERPL-MCNC: 224 PG/ML (ref ?–125)
OSMOLALITY SERPL CALC.SUM OF ELEC: 312 MOSM/KG (ref 275–295)
PH UR: 5.5 [PH] (ref 5–8)
PLATELET # BLD AUTO: 181 10(3)UL (ref 150–450)
POTASSIUM SERPL-SCNC: 4.2 MMOL/L (ref 3.5–5.1)
PROT SERPL-MCNC: 7.3 G/DL (ref 6.4–8.2)
PROT UR-MCNC: NEGATIVE MG/DL
RBC # BLD AUTO: 4.04 X10(6)UL
SARS-COV-2 RNA RESP QL NAA+PROBE: NOT DETECTED
SODIUM SERPL-SCNC: 142 MMOL/L (ref 136–145)
SP GR UR STRIP: 1.01 (ref 1–1.03)
TROPONIN I HIGH SENSITIVITY: 10 NG/L
UROBILINOGEN UR STRIP-ACNC: 0.2
WBC # BLD AUTO: 9.4 X10(3) UL (ref 4–11)

## 2022-07-02 PROCEDURE — 80076 HEPATIC FUNCTION PANEL: CPT | Performed by: EMERGENCY MEDICINE

## 2022-07-02 PROCEDURE — 96365 THER/PROPH/DIAG IV INF INIT: CPT

## 2022-07-02 PROCEDURE — 85025 COMPLETE CBC W/AUTO DIFF WBC: CPT | Performed by: EMERGENCY MEDICINE

## 2022-07-02 PROCEDURE — 71260 CT THORAX DX C+: CPT | Performed by: EMERGENCY MEDICINE

## 2022-07-02 PROCEDURE — 93010 ELECTROCARDIOGRAM REPORT: CPT | Performed by: EMERGENCY MEDICINE

## 2022-07-02 PROCEDURE — 83880 ASSAY OF NATRIURETIC PEPTIDE: CPT | Performed by: EMERGENCY MEDICINE

## 2022-07-02 PROCEDURE — 85730 THROMBOPLASTIN TIME PARTIAL: CPT | Performed by: HOSPITALIST

## 2022-07-02 PROCEDURE — 83036 HEMOGLOBIN GLYCOSYLATED A1C: CPT | Performed by: HOSPITALIST

## 2022-07-02 PROCEDURE — 81025 URINE PREGNANCY TEST: CPT

## 2022-07-02 PROCEDURE — 96375 TX/PRO/DX INJ NEW DRUG ADDON: CPT

## 2022-07-02 PROCEDURE — 82962 GLUCOSE BLOOD TEST: CPT

## 2022-07-02 PROCEDURE — 93005 ELECTROCARDIOGRAM TRACING: CPT

## 2022-07-02 PROCEDURE — 93306 TTE W/DOPPLER COMPLETE: CPT | Performed by: HOSPITALIST

## 2022-07-02 PROCEDURE — 94660 CPAP INITIATION&MGMT: CPT

## 2022-07-02 PROCEDURE — 83036 HEMOGLOBIN GLYCOSYLATED A1C: CPT

## 2022-07-02 PROCEDURE — 93970 EXTREMITY STUDY: CPT | Performed by: HOSPITALIST

## 2022-07-02 PROCEDURE — 85379 FIBRIN DEGRADATION QUANT: CPT | Performed by: EMERGENCY MEDICINE

## 2022-07-02 PROCEDURE — 99285 EMERGENCY DEPT VISIT HI MDM: CPT

## 2022-07-02 PROCEDURE — 84484 ASSAY OF TROPONIN QUANT: CPT | Performed by: EMERGENCY MEDICINE

## 2022-07-02 PROCEDURE — 85730 THROMBOPLASTIN TIME PARTIAL: CPT | Performed by: EMERGENCY MEDICINE

## 2022-07-02 PROCEDURE — 71045 X-RAY EXAM CHEST 1 VIEW: CPT | Performed by: EMERGENCY MEDICINE

## 2022-07-02 PROCEDURE — 80048 BASIC METABOLIC PNL TOTAL CA: CPT | Performed by: EMERGENCY MEDICINE

## 2022-07-02 PROCEDURE — 94002 VENT MGMT INPAT INIT DAY: CPT

## 2022-07-02 RX ORDER — METOCLOPRAMIDE HYDROCHLORIDE 5 MG/ML
5 INJECTION INTRAMUSCULAR; INTRAVENOUS EVERY 8 HOURS PRN
Status: DISCONTINUED | OUTPATIENT
Start: 2022-07-02 | End: 2022-07-03

## 2022-07-02 RX ORDER — ONDANSETRON 2 MG/ML
4 INJECTION INTRAMUSCULAR; INTRAVENOUS EVERY 6 HOURS PRN
Status: DISCONTINUED | OUTPATIENT
Start: 2022-07-02 | End: 2022-07-03

## 2022-07-02 RX ORDER — HYDROCODONE BITARTRATE AND ACETAMINOPHEN 5; 325 MG/1; MG/1
2 TABLET ORAL EVERY 4 HOURS PRN
Status: DISCONTINUED | OUTPATIENT
Start: 2022-07-02 | End: 2022-07-02

## 2022-07-02 RX ORDER — ACETAMINOPHEN 500 MG
500 TABLET ORAL EVERY 4 HOURS PRN
Status: DISCONTINUED | OUTPATIENT
Start: 2022-07-02 | End: 2022-07-03

## 2022-07-02 RX ORDER — MELATONIN
2000 DAILY
Status: DISCONTINUED | OUTPATIENT
Start: 2022-07-02 | End: 2022-07-03

## 2022-07-02 RX ORDER — HEPARIN SODIUM AND DEXTROSE 10000; 5 [USP'U]/100ML; G/100ML
18 INJECTION INTRAVENOUS ONCE
Status: COMPLETED | OUTPATIENT
Start: 2022-07-02 | End: 2022-07-03

## 2022-07-02 RX ORDER — ACETAMINOPHEN 325 MG/1
650 TABLET ORAL EVERY 4 HOURS PRN
Status: DISCONTINUED | OUTPATIENT
Start: 2022-07-02 | End: 2022-07-03

## 2022-07-02 RX ORDER — OXYCODONE AND ACETAMINOPHEN 7.5; 325 MG/1; MG/1
1 TABLET ORAL EVERY 4 HOURS PRN
Status: DISCONTINUED | OUTPATIENT
Start: 2022-07-02 | End: 2022-07-03

## 2022-07-02 RX ORDER — HYDROCODONE BITARTRATE AND ACETAMINOPHEN 5; 325 MG/1; MG/1
1 TABLET ORAL EVERY 4 HOURS PRN
Status: DISCONTINUED | OUTPATIENT
Start: 2022-07-02 | End: 2022-07-02

## 2022-07-02 RX ORDER — HEPARIN SODIUM 1000 [USP'U]/ML
80 INJECTION, SOLUTION INTRAVENOUS; SUBCUTANEOUS ONCE
Status: COMPLETED | OUTPATIENT
Start: 2022-07-02 | End: 2022-07-02

## 2022-07-02 RX ORDER — HEPARIN SODIUM AND DEXTROSE 10000; 5 [USP'U]/100ML; G/100ML
INJECTION INTRAVENOUS CONTINUOUS
Status: DISCONTINUED | OUTPATIENT
Start: 2022-07-02 | End: 2022-07-03

## 2022-07-02 RX ORDER — PROPRANOLOL HYDROCHLORIDE 10 MG/1
10 TABLET ORAL 3 TIMES DAILY
Status: DISCONTINUED | OUTPATIENT
Start: 2022-07-02 | End: 2022-07-03

## 2022-07-02 RX ORDER — SERTRALINE HYDROCHLORIDE 100 MG/1
100 TABLET, FILM COATED ORAL DAILY
Status: DISCONTINUED | OUTPATIENT
Start: 2022-07-02 | End: 2022-07-03

## 2022-07-02 RX ORDER — ZOLPIDEM TARTRATE 5 MG/1
5 TABLET ORAL NIGHTLY
Status: DISCONTINUED | OUTPATIENT
Start: 2022-07-02 | End: 2022-07-03

## 2022-07-02 RX ORDER — MORPHINE SULFATE 4 MG/ML
4 INJECTION, SOLUTION INTRAMUSCULAR; INTRAVENOUS ONCE
Status: COMPLETED | OUTPATIENT
Start: 2022-07-02 | End: 2022-07-02

## 2022-07-02 RX ORDER — DULOXETIN HYDROCHLORIDE 30 MG/1
30 CAPSULE, DELAYED RELEASE ORAL DAILY
Status: DISCONTINUED | OUTPATIENT
Start: 2022-07-02 | End: 2022-07-03

## 2022-07-02 RX ORDER — LACTULOSE 10 G/15ML
20 SOLUTION ORAL DAILY
Status: DISCONTINUED | OUTPATIENT
Start: 2022-07-02 | End: 2022-07-03

## 2022-07-02 RX ORDER — ATORVASTATIN CALCIUM 20 MG/1
20 TABLET, FILM COATED ORAL DAILY
Status: DISCONTINUED | OUTPATIENT
Start: 2022-07-02 | End: 2022-07-03

## 2022-07-02 NOTE — CM/SW NOTE
MDO to GREGORIO for Sending over eliquis to pharmacy for coverage, pt with limited resources. Per Viraj Lucia at [facility](preferred pharmacy) pt's  picked up RX for total of $47.    SW/CM to remain available for support and/or discharge planning.      VASQUEZ Dawson, [facility]  [employer] Work   VRQ:#[phone]

## 2022-07-02 NOTE — ED QUICK NOTES
Patient assisted to bathroom. Became very short of breathe while getting into bed. 77% on room air. 2L applied - patient now 96%.

## 2022-07-02 NOTE — ED INITIAL ASSESSMENT (HPI)
Pt c/o sob. Pt states she had surgery today. Pt states the sob get's worse with movement. Pt denies chest pain.

## 2022-07-02 NOTE — ED QUICK NOTES
Orders for admission, patient is aware of plan and ready to go upstairs. Any questions, please call ED RN Rhoda Wiley at extension [phone]. Patient Covid vaccination status: Fully vaccinated     COVID Test Ordered in ED: Rapid SARS-CoV-2 by PCR    COVID Suspicion at Admission: No risk with negative rapid    Running Infusions:  None    Mental Status/LOC at time of transport: AOx4    Other pertinent information: has L shoulder in immobilizer s/p shoulder surgery 7/01.   CIWA score: N/A   NIH score:  N/A

## 2022-07-02 NOTE — PLAN OF CARE
Problem: Patient Centered Care  Goal: Patient preferences are identified and integrated in the patient's plan of care  Description: Interventions:  - What would you like us to know as we care for you?   - Provide timely, complete, and accurate information to patient/family  - Incorporate patient and family knowledge, values, beliefs, and cultural backgrounds into the planning and delivery of care  - Encourage patient/family to participate in care and decision-making at the level they choose  - Honor patient and family perspectives and choices  Outcome: Progressing     Problem: Diabetes/Glucose Control  Goal: Glucose maintained within prescribed range  Description: INTERVENTIONS:  - Monitor Blood Glucose as ordered  - Assess for signs and symptoms of hyperglycemia and hypoglycemia  - Administer ordered medications to maintain glucose within target range  - Assess barriers to adequate nutritional intake and initiate nutrition consult as needed  - Instruct patient on self management of diabetes  Outcome: Progressing     Problem: Patient/Family Goals  Goal: Patient/Family Long Term Goal  Description: Patient's Long Term Goal: Manage health at home    Interventions:  - Understand medical regimen  - Monitor symptoms  - Follow up with MD outpatient  - See additional Care Plan goals for specific interventions  Outcome: Progressing  Goal: Patient/Family Short Term Goal  Description: Patient's Short Term Goal: Breathe better    Interventions:   - Supplemental O2 prn  - Monitor vitals  - Chest xr  - CT  - Ddimer  - Heparin gtt  - BNP  - See additional Care Plan goals for specific interventions  Outcome: Progressing     Problem: RESPIRATORY - ADULT  Goal: Achieves optimal ventilation and oxygenation  Description: INTERVENTIONS:  - Assess for changes in respiratory status  - Assess for changes in mentation and behavior  - Position to facilitate oxygenation and minimize respiratory effort  - Oxygen supplementation based on oxygen saturation or ABGs  - Provide Smoking Cessation handout, if applicable  - Encourage broncho-pulmonary hygiene including cough, deep breathe, Incentive Spirometry  - Assess the need for suctioning and perform as needed  - Assess and instruct to report SOB or any respiratory difficulty  - Respiratory Therapy support as indicated  - Manage/alleviate anxiety  - Monitor for signs/symptoms of CO2 retention  Outcome: Progressing     Problem: CARDIOVASCULAR - ADULT  Goal: Maintains optimal cardiac output and hemodynamic stability  Description: INTERVENTIONS:  - Monitor vital signs, rhythm, and trends  - Monitor for bleeding, hypotension and signs of decreased cardiac output  - Evaluate effectiveness of vasoactive medications to optimize hemodynamic stability  - Monitor arterial and/or venous puncture sites for bleeding and/or hematoma  - Assess quality of pulses, skin color and temperature  - Assess for signs of decreased coronary artery perfusion - ex.  Angina  - Evaluate fluid balance, assess for edema, trend weights  Outcome: Progressing  Goal: Absence of cardiac arrhythmias or at baseline  Description: INTERVENTIONS:  - Continuous cardiac monitoring, monitor vital signs, obtain 12 lead EKG if indicated  - Evaluate effectiveness of antiarrhythmic and heart rate control medications as ordered  - Initiate emergency measures for life threatening arrhythmias  - Monitor electrolytes and administer replacement therapy as ordered  Outcome: Progressing     Problem: PAIN - ADULT  Goal: Verbalizes/displays adequate comfort level or patient's stated pain goal  Description: INTERVENTIONS:  - Encourage pt to monitor pain and request assistance  - Assess pain using appropriate pain scale  - Administer analgesics based on type and severity of pain and evaluate response  - Implement non-pharmacological measures as appropriate and evaluate response  - Consider cultural and social influences on pain and pain management  - Manage/alleviate anxiety  - Utilize distraction and/or relaxation techniques  - Monitor for opioid side effects  - Notify MD/LIP if interventions unsuccessful or patient reports new pain  - Anticipate increased pain with activity and pre-medicate as appropriate  Outcome: Progressing     Problem: SAFETY ADULT - FALL  Goal: Free from fall injury  Description: INTERVENTIONS:  - Assess pt frequently for physical needs  - Identify cognitive and physical deficits and behaviors that affect risk of falls. - [place] fall precautions as indicated by assessment.  - Educate pt/family on patient safety including physical limitations  - Instruct pt to call for assistance with activity based on assessment  - Modify environment to reduce risk of injury  - Provide assistive devices as appropriate  - Consider OT/PT consult to assist with strengthening/mobility  - Encourage toileting schedule  Outcome: Progressing   Patient alert and orientated. NC at 2L and has no home O2. Heparin gtt. Stool sample unable to be collected do to urine contamination. Norco given for surgical pain.

## 2022-07-02 NOTE — ANESTHESIA POSTPROCEDURE EVALUATION
Patient: Ajith Masterson    Procedure Summary     Date: 07/01/22 Room / Location: [facility] OR 09 / [facility] OR    Anesthesia Start: 6200 Anesthesia Stop: 1916    Procedure: Left shoulder arthroscopy with debridement, subacromial decompression with acromioplasty, rotator cuff repair, arthroscopic bicep tenodesis (Left ) Diagnosis: (traumatic complete tear of left rotator cuff, biceps tendonitis of left upper extremity, impingement syndrome of left shoulder, left arm weakness)    Surgeons: Cesar Jj MD Anesthesiologist: Jeannie Cummings MD    Anesthesia Type: general ASA Status: 1          Anesthesia Type: general    Vitals Value Taken Time   /67 07/01/22 1916   Temp 97.4 07/01/22 1916   Pulse 81 07/01/22 1916   Resp 22 07/01/22 1916   SpO2 95 % 07/01/22 1916   Vitals shown include unvalidated device data.     [facility] Post Evaluation:   Patient Evaluated in PACU  Patient Participation: complete - patient participated  Level of Consciousness: awake  Pain Score: 0  Pain Management: adequate  Airway Patency:patent  Dental exam unchanged from preop  Yes    Cardiovascular Status: acceptable  Respiratory Status: acceptable  Postoperative Hydration acceptable      Marcelina De La Cruz MD  7/1/2022 7:16 PM

## 2022-07-03 VITALS
RESPIRATION RATE: 16 BRPM | SYSTOLIC BLOOD PRESSURE: 146 MMHG | BODY MASS INDEX: 36.4 KG/M2 | HEART RATE: 84 BPM | HEIGHT: 62 IN | OXYGEN SATURATION: 95 % | DIASTOLIC BLOOD PRESSURE: 75 MMHG | WEIGHT: 197.81 LBS | TEMPERATURE: 98 F

## 2022-07-03 LAB
ALBUMIN SERPL-MCNC: 3.6 G/DL (ref 3.4–5)
ALBUMIN/GLOB SERPL: 1.2 {RATIO} (ref 1–2)
ALP LIVER SERPL-CCNC: 99 U/L
ALT SERPL-CCNC: 33 U/L
ANION GAP SERPL CALC-SCNC: 6 MMOL/L (ref 0–18)
APTT PPP: 128.2 SECONDS (ref 23.3–35.6)
AST SERPL-CCNC: 31 U/L (ref 15–37)
BASOPHILS # BLD AUTO: 0.07 X10(3) UL (ref 0–0.2)
BASOPHILS NFR BLD AUTO: 0.7 %
BILIRUB SERPL-MCNC: 0.7 MG/DL (ref 0.1–2)
BUN BLD-MCNC: 13 MG/DL (ref 7–18)
BUN/CREAT SERPL: 18.8 (ref 10–20)
CALCIUM BLD-MCNC: 8.4 MG/DL (ref 8.5–10.1)
CHLORIDE SERPL-SCNC: 107 MMOL/L (ref 98–112)
CO2 SERPL-SCNC: 27 MMOL/L (ref 21–32)
CREAT BLD-MCNC: 0.69 MG/DL
DEPRECATED RDW RBC AUTO: 49.9 FL (ref 35.1–46.3)
EOSINOPHIL # BLD AUTO: 0 X10(3) UL (ref 0–0.7)
EOSINOPHIL NFR BLD AUTO: 0 %
ERYTHROCYTE [DISTWIDTH] IN BLOOD BY AUTOMATED COUNT: 14 % (ref 11–15)
GLOBULIN PLAS-MCNC: 3.1 G/DL (ref 2.8–4.4)
GLUCOSE BLD-MCNC: 182 MG/DL (ref 70–99)
GLUCOSE BLDC GLUCOMTR-MCNC: 185 MG/DL (ref 70–99)
GLUCOSE BLDC GLUCOMTR-MCNC: 210 MG/DL (ref 70–99)
HCT VFR BLD AUTO: 37.1 %
HGB BLD-MCNC: 11.5 G/DL
IMM GRANULOCYTES # BLD AUTO: 0.04 X10(3) UL (ref 0–1)
IMM GRANULOCYTES NFR BLD: 0.4 %
LYMPHOCYTES # BLD AUTO: 2.53 X10(3) UL (ref 1–4)
LYMPHOCYTES NFR BLD AUTO: 26.7 %
MCH RBC QN AUTO: 30.1 PG (ref 26–34)
MCHC RBC AUTO-ENTMCNC: 31 G/DL (ref 31–37)
MCV RBC AUTO: 97.1 FL
MONOCYTES # BLD AUTO: 0.82 X10(3) UL (ref 0.1–1)
MONOCYTES NFR BLD AUTO: 8.6 %
NEUTROPHILS # BLD AUTO: 6.03 X10 (3) UL (ref 1.5–7.7)
NEUTROPHILS # BLD AUTO: 6.03 X10(3) UL (ref 1.5–7.7)
NEUTROPHILS NFR BLD AUTO: 63.6 %
OSMOLALITY SERPL CALC.SUM OF ELEC: 295 MOSM/KG (ref 275–295)
PLATELET # BLD AUTO: 156 10(3)UL (ref 150–450)
POTASSIUM SERPL-SCNC: 3.7 MMOL/L (ref 3.5–5.1)
PROT SERPL-MCNC: 6.7 G/DL (ref 6.4–8.2)
RBC # BLD AUTO: 3.82 X10(6)UL
SODIUM SERPL-SCNC: 140 MMOL/L (ref 136–145)
WBC # BLD AUTO: 9.5 X10(3) UL (ref 4–11)

## 2022-07-03 PROCEDURE — 97165 OT EVAL LOW COMPLEX 30 MIN: CPT

## 2022-07-03 PROCEDURE — 97530 THERAPEUTIC ACTIVITIES: CPT

## 2022-07-03 PROCEDURE — 85025 COMPLETE CBC W/AUTO DIFF WBC: CPT | Performed by: HOSPITALIST

## 2022-07-03 PROCEDURE — 82962 GLUCOSE BLOOD TEST: CPT

## 2022-07-03 PROCEDURE — 97161 PT EVAL LOW COMPLEX 20 MIN: CPT

## 2022-07-03 PROCEDURE — 85730 THROMBOPLASTIN TIME PARTIAL: CPT | Performed by: HOSPITALIST

## 2022-07-03 PROCEDURE — 97116 GAIT TRAINING THERAPY: CPT

## 2022-07-03 PROCEDURE — 80053 COMPREHEN METABOLIC PANEL: CPT | Performed by: HOSPITALIST

## 2022-07-03 RX ORDER — ZOLPIDEM TARTRATE 5 MG/1
5 TABLET ORAL NIGHTLY PRN
Qty: 10 TABLET | Refills: 0 | Status: SHIPPED | OUTPATIENT
Start: 2022-07-03

## 2022-07-03 RX ORDER — OXYCODONE HYDROCHLORIDE AND ACETAMINOPHEN 5; 325 MG/1; MG/1
1-2 TABLET ORAL EVERY 6 HOURS PRN
Qty: 15 TABLET | Refills: 0 | Status: SHIPPED | OUTPATIENT
Start: 2022-07-03

## 2022-07-03 RX ORDER — POTASSIUM CHLORIDE 20 MEQ/1
40 TABLET, EXTENDED RELEASE ORAL ONCE
Status: COMPLETED | OUTPATIENT
Start: 2022-07-03 | End: 2022-07-03

## 2022-07-03 NOTE — PLAN OF CARE
AO3, anxious. Up with assist.  Positive PE, heparin drip per protocol. Echo pending results. US LE doppler negative for DVT. Pain management and adjusted. Will need transition to oral anticoagulant upon discharge. Problem: RESPIRATORY - ADULT  Goal: Achieves optimal ventilation and oxygenation  Description: INTERVENTIONS:  - Assess for changes in respiratory status  - Assess for changes in mentation and behavior  - Position to facilitate oxygenation and minimize respiratory effort  - Oxygen supplementation based on oxygen saturation or ABGs  - Provide Smoking Cessation handout, if applicable  - Encourage broncho-pulmonary hygiene including cough, deep breathe, Incentive Spirometry  - Assess the need for suctioning and perform as needed  - Assess and instruct to report SOB or any respiratory difficulty  - Respiratory Therapy support as indicated  - Manage/alleviate anxiety  - Monitor for signs/symptoms of CO2 retention  Outcome: Progressing     Problem: CARDIOVASCULAR - ADULT  Goal: Maintains optimal cardiac output and hemodynamic stability  Description: INTERVENTIONS:  - Monitor vital signs, rhythm, and trends  - Monitor for bleeding, hypotension and signs of decreased cardiac output  - Evaluate effectiveness of vasoactive medications to optimize hemodynamic stability  - Monitor arterial and/or venous puncture sites for bleeding and/or hematoma  - Assess quality of pulses, skin color and temperature  - Assess for signs of decreased coronary artery perfusion - ex.  Angina  - Evaluate fluid balance, assess for edema, trend weights  Outcome: Progressing  Goal: Absence of cardiac arrhythmias or at baseline  Description: INTERVENTIONS:  - Continuous cardiac monitoring, monitor vital signs, obtain 12 lead EKG if indicated  - Evaluate effectiveness of antiarrhythmic and heart rate control medications as ordered  - Initiate emergency measures for life threatening arrhythmias  - Monitor electrolytes and administer replacement therapy as ordered  Outcome: Progressing

## 2022-07-03 NOTE — PLAN OF CARE
Problem: Patient Centered Care  Goal: Patient preferences are identified and integrated in the patient's plan of care  Description: Interventions:  - What would you like us to know as we care for you?   - Provide timely, complete, and accurate information to patient/family  - Incorporate patient and family knowledge, values, beliefs, and cultural backgrounds into the planning and delivery of care  - Encourage patient/family to participate in care and decision-making at the level they choose  - Honor patient and family perspectives and choices  Outcome: Completed     Problem: Diabetes/Glucose Control  Goal: Glucose maintained within prescribed range  Description: INTERVENTIONS:  - Monitor Blood Glucose as ordered  - Assess for signs and symptoms of hyperglycemia and hypoglycemia  - Administer ordered medications to maintain glucose within target range  - Assess barriers to adequate nutritional intake and initiate nutrition consult as needed  - Instruct patient on self management of diabetes  Outcome: Completed     Problem: Patient/Family Goals  Goal: Patient/Family Long Term Goal  Description: Patient's Long Term Goal: Manage health at home    Interventions:  - Understand medical regimen  - Monitor symptoms  - Follow up with MD outpatient  - See additional Care Plan goals for specific interventions  Outcome: Completed  Goal: Patient/Family Short Term Goal  Description: Patient's Short Term Goal: Breathe better    Interventions:   - Supplemental O2 prn  - Monitor vitals  - Chest xr  - CT  - Ddimer  - Heparin gtt  - BNP  - See additional Care Plan goals for specific interventions  Outcome: Completed     Problem: RESPIRATORY - ADULT  Goal: Achieves optimal ventilation and oxygenation  Description: INTERVENTIONS:  - Assess for changes in respiratory status  - Assess for changes in mentation and behavior  - Position to facilitate oxygenation and minimize respiratory effort  - Oxygen supplementation based on oxygen saturation or ABGs  - Provide Smoking Cessation handout, if applicable  - Encourage broncho-pulmonary hygiene including cough, deep breathe, Incentive Spirometry  - Assess the need for suctioning and perform as needed  - Assess and instruct to report SOB or any respiratory difficulty  - Respiratory Therapy support as indicated  - Manage/alleviate anxiety  - Monitor for signs/symptoms of CO2 retention  Outcome: Completed     Problem: CARDIOVASCULAR - ADULT  Goal: Maintains optimal cardiac output and hemodynamic stability  Description: INTERVENTIONS:  - Monitor vital signs, rhythm, and trends  - Monitor for bleeding, hypotension and signs of decreased cardiac output  - Evaluate effectiveness of vasoactive medications to optimize hemodynamic stability  - Monitor arterial and/or venous puncture sites for bleeding and/or hematoma  - Assess quality of pulses, skin color and temperature  - Assess for signs of decreased coronary artery perfusion - ex.  Angina  - Evaluate fluid balance, assess for edema, trend weights  Outcome: Completed  Goal: Absence of cardiac arrhythmias or at baseline  Description: INTERVENTIONS:  - Continuous cardiac monitoring, monitor vital signs, obtain 12 lead EKG if indicated  - Evaluate effectiveness of antiarrhythmic and heart rate control medications as ordered  - Initiate emergency measures for life threatening arrhythmias  - Monitor electrolytes and administer replacement therapy as ordered  Outcome: Completed     Problem: PAIN - ADULT  Goal: Verbalizes/displays adequate comfort level or patient's stated pain goal  Description: INTERVENTIONS:  - Encourage pt to monitor pain and request assistance  - Assess pain using appropriate pain scale  - Administer analgesics based on type and severity of pain and evaluate response  - Implement non-pharmacological measures as appropriate and evaluate response  - Consider cultural and social influences on pain and pain management  - Manage/alleviate anxiety  - Utilize distraction and/or relaxation techniques  - Monitor for opioid side effects  - Notify MD/LIP if interventions unsuccessful or patient reports new pain  - Anticipate increased pain with activity and pre-medicate as appropriate  Outcome: Completed     Problem: SAFETY ADULT - FALL  Goal: Free from fall injury  Description: INTERVENTIONS:  - Assess pt frequently for physical needs  - Identify cognitive and physical deficits and behaviors that affect risk of falls. - [place] fall precautions as indicated by assessment.  - Educate pt/family on patient safety including physical limitations  - Instruct pt to call for assistance with activity based on assessment  - Modify environment to reduce risk of injury  - Provide assistive devices as appropriate  - Consider OT/PT consult to assist with strengthening/mobility  - Encourage toileting schedule  Outcome: Completed   reviewed discharge instructions with  and patient. Patient and  stated they understood. IV and tele removed.

## 2022-07-03 NOTE — CM/SW NOTE
07/03/22 0900   CM/SW Referral Data   Referral Source Physician   Reason for Referral Discharge planning   Informant Patient   Patient Info   Advanced directives? Yes   Patient's Current Mental Status at Time of Assessment Alert;Oriented   Patient's Home Environment Condo/Apt no elevator   Number of Levels in Home 1   Number of Stair in Home   (14)   Patient lives with Spouse/Significant other   Patient Status Prior to Admission   Independent with ADLs and Mobility Yes   Discharge Needs   Anticipated D/C needs No anticipated discharge needs   Choice of Post-Acute Provider   Informed patient of right to choose their preferred provider Yes     Received MDO for [address] patient in her room and she confirmed that she lives with her ex-  and that she is independent at baseline. Now that she has had rotator cuff surgery and unable to drive, her ex spouse will assist with IADLs. She lives in a 1 level apartment, but there are 14 stairs to get to the apartment. She states she is able to manage \"I just take my time\"  Pt doesn't feel she needs HH services at this time and she will not be home bound    Await PT/OT evals and if needed, will readdress with the patient     / to remain available for support and/or discharge planning.      Rosalva Harper MBA MSN, RN CTL/  [id number]

## 2022-07-15 ENCOUNTER — PATIENT OUTREACH (OUTPATIENT)
Dept: CASE MANAGEMENT | Age: 67
End: 2022-07-15

## 2022-07-15 NOTE — PROGRESS NOTES
1st attempt to review DM f/u questions    LVM to call [phone] to review DM f/u questions; will try again

## 2022-07-20 NOTE — PROGRESS NOTES
3rd attempt to review DM f/u questions  Pt answered all questions; sending DM information  [facility] [address] [place] [address] Drive #    Diabetic Outreach D/C Follow Up Questions:     1. Did you receive the information provided during your hospitalization and at discharge about diabetes? yes    If No:  Would you like us to mail you the information? yes   If Yes:  Was the information helpful? Yes     2. Were there any changes made to your medications? no            3. Do you have all of your diabetes pills and or insulin now that you are home? Yes  If yes:  do you understand the changes made? yes      4. Are you confident in managing your diabetes? yes     5. Did you make the necessary transportation arrangements to get to your diabetes follow-up appointment? No         If pt answers No to questions #3 and #4 Advise pt you will have a clinical person contact them to address.

## 2022-08-05 ENCOUNTER — PATIENT OUTREACH (OUTPATIENT)
Dept: CASE MANAGEMENT | Age: 67
End: 2022-08-05

## 2022-12-23 RX ORDER — GLIMEPIRIDE 2 MG/1
2 TABLET ORAL
COMMUNITY

## 2022-12-30 ENCOUNTER — ANESTHESIA EVENT (OUTPATIENT)
Dept: ENDOSCOPY | Facility: HOSPITAL | Age: 67
End: 2022-12-30
Payer: MEDICARE

## 2022-12-30 ENCOUNTER — HOSPITAL ENCOUNTER (OUTPATIENT)
Facility: HOSPITAL | Age: 67
Setting detail: HOSPITAL OUTPATIENT SURGERY
Discharge: HOME | End: 2022-12-30
Attending: INTERNAL MEDICINE | Admitting: INTERNAL MEDICINE
Payer: MEDICARE

## 2022-12-30 ENCOUNTER — ANESTHESIA (OUTPATIENT)
Dept: ENDOSCOPY | Facility: HOSPITAL | Age: 67
End: 2022-12-30
Payer: MEDICARE

## 2022-12-30 VITALS
DIASTOLIC BLOOD PRESSURE: 70 MMHG | BODY MASS INDEX: 35.88 KG/M2 | TEMPERATURE: 98 F | WEIGHT: 195 LBS | SYSTOLIC BLOOD PRESSURE: 141 MMHG | HEART RATE: 58 BPM | OXYGEN SATURATION: 92 % | HEIGHT: 62 IN | RESPIRATION RATE: 22 BRPM

## 2022-12-30 DIAGNOSIS — Z86.010 PERSONAL HISTORY OF COLONIC POLYPS: ICD-10-CM

## 2022-12-30 LAB — GLUCOSE BLDC GLUCOMTR-MCNC: 146 MG/DL (ref 70–99)

## 2022-12-30 PROCEDURE — 0DBN8ZZ EXCISION OF SIGMOID COLON, VIA NATURAL OR ARTIFICIAL OPENING ENDOSCOPIC: ICD-10-PCS | Performed by: INTERNAL MEDICINE

## 2022-12-30 PROCEDURE — 82962 GLUCOSE BLOOD TEST: CPT

## 2022-12-30 PROCEDURE — 0DBL8ZZ EXCISION OF TRANSVERSE COLON, VIA NATURAL OR ARTIFICIAL OPENING ENDOSCOPIC: ICD-10-PCS | Performed by: INTERNAL MEDICINE

## 2022-12-30 PROCEDURE — 88305 TISSUE EXAM BY PATHOLOGIST: CPT | Performed by: INTERNAL MEDICINE

## 2022-12-30 RX ORDER — NALOXONE HYDROCHLORIDE 0.4 MG/ML
80 INJECTION, SOLUTION INTRAMUSCULAR; INTRAVENOUS; SUBCUTANEOUS AS NEEDED
Status: DISCONTINUED | OUTPATIENT
Start: 2022-12-30 | End: 2022-12-30

## 2022-12-30 RX ORDER — SODIUM CHLORIDE, SODIUM LACTATE, POTASSIUM CHLORIDE, CALCIUM CHLORIDE 600; 310; 30; 20 MG/100ML; MG/100ML; MG/100ML; MG/100ML
INJECTION, SOLUTION INTRAVENOUS CONTINUOUS
Status: DISCONTINUED | OUTPATIENT
Start: 2022-12-30 | End: 2022-12-30

## 2022-12-30 RX ORDER — SODIUM CHLORIDE, SODIUM LACTATE, POTASSIUM CHLORIDE, CALCIUM CHLORIDE 600; 310; 30; 20 MG/100ML; MG/100ML; MG/100ML; MG/100ML
INJECTION, SOLUTION INTRAVENOUS CONTINUOUS PRN
Status: DISCONTINUED | OUTPATIENT
Start: 2022-12-30 | End: 2022-12-30 | Stop reason: SURG

## 2022-12-30 RX ADMIN — SODIUM CHLORIDE, SODIUM LACTATE, POTASSIUM CHLORIDE, CALCIUM CHLORIDE: 600; 310; 30; 20 INJECTION, SOLUTION INTRAVENOUS at 08:26:00

## 2022-12-30 RX ADMIN — SODIUM CHLORIDE, SODIUM LACTATE, POTASSIUM CHLORIDE, CALCIUM CHLORIDE: 600; 310; 30; 20 INJECTION, SOLUTION INTRAVENOUS at 09:00:00

## 2022-12-30 NOTE — OPERATIVE REPORT
ENDOSCOPY OPERATIVE REPORT    Patient Name: Lashell Martin Record #: [id number]  YOB: 1955  Date of Procedure: 12/30/2022    Preoperative Diagnosis: History of adenomatous colon polyps. Abdominal pain. Last colonoscopy was in 8/2019. Postoperative Diagnosis:    1.) 6 mm, 3 mm x 2 transverse colon, 2-3 mm x 5 rectosigmoid polyps = removed. 2.) Mild left sided diverticulosis. 3.)  Internal and external hemorrhoids. Procedure Performed: Colonoscopy with biopsy. Withdrawal time: 20 minutes. ASA Class: 3. Anesthesia Given: MAC. Moderate sedation time: NA. Deep sedation was provided by anesthesiologist.     Endoscopist: Kvng Chan MD  Procedure: The patient was interviewed and the procedure again discussed and questions addressed. Medical reconciliation was completed. History and physical were reviewed. Informed consent was obtained. The patient was brought to the GI Lab and monitoring of the B/P, pulse, and pulse oximetry was performed. The patient was then placed in the left lateral decubitus position and sedated with divided doses of IV medication; continuous vital signs were monitored throughout the procedure. A rectal exam was performed and it revealed external hemorrhoidal tissue. The [place]ump[place] video colonoscope was inserted into the rectum and advanced to the cecum without difficulty. The cecal base was visualized but could not be intubated. Upon insertion and withdrawl the mucosa was carefully examined and the preparation was Aronchick Score 1. The patient tolerated the procedure well. Aronchick Bowel Prep:  Score:  1 = Excellent (>95% mucosa seen)   2 = Good (clear liquid up to 25% of mucosa, 90% mucosa seen)    3 = fair (semisolid stool not suctioned, but 90% mucosa seen)    4 = poor (semisolid stool not suctioned, <90% mucosa seen)   5 = inadequate (repeat prep needed). FINDINGS: eight small polyps were noted as above and removed using cold biopsy forceps.  Mild left sided diverticulosis was noted. The overall appearance of the mucosa was normal. At the anal verge the endoscope was retroverted, internal hemorrhoids were seen, no other abnormalities were indentified. Throughtout the procedure vital signs were stable. Complications: none. PLAN: Patient is to follow a high fiber, low fat diet and followup with primary physician for routine care. Await biopsy results. I have requested the patient to follow-up with me in the clinic. Recommend CT-abdomen-pelvis pending biopsy results. The patient and  were informed of the endoscopic findings and was also given a copy of findings, postoperative instructions, and postoperative precautions. Tiffanie Diana MD.   [facility].

## 2022-12-30 NOTE — DISCHARGE INSTRUCTIONS
Home Care Instructions for Colonoscopy with Sedation    Diet:  - Resume your regular diet as tolerated unless otherwise instructed. - Start with light meals to minimize bloating.  - Do not drink alcohol today. Medication:  - If you have questions about resuming your normal medications, please contact your Primary Care Physician. Activities:  - Take it easy today. Do not return to work today. - Do not drive today. - Do not operate any machinery today (including kitchen equipment). Colonoscopy:  - You may notice some rectal \"spotting\" (a little blood on the toilet tissue) for a day or two after the exam. This is normal.  - If you experience any rectal bleeding (not spotting), persistent tenderness or sharp severe abdominal pains, oral temperature over 100 degrees Fahrenheit, light-headedness or dizziness, or any other problems, contact your doctor. **If unable to reach your doctor, please go to the [facility] Emergency Room**    - Your referring physician will receive a full report of your examination.  - If you do not hear from your doctor's office within two weeks of your biopsy, please call them for your results. You may be able to see your laboratory results in [place] between 4 and 7 business days. In some cases, your physician may not have viewed the results before they are released to [facility]. If you have questions regarding your results contact the physician who ordered the test/exam by phone or via [employer] by choosing \"Ask a Medical Question. \"

## 2023-09-20 RX ORDER — OMEPRAZOLE 20 MG/1
40 CAPSULE, DELAYED RELEASE ORAL
COMMUNITY

## 2023-10-05 ENCOUNTER — HOSPITAL ENCOUNTER (OUTPATIENT)
Facility: HOSPITAL | Age: 68
Setting detail: HOSPITAL OUTPATIENT SURGERY
Discharge: HOME | End: 2023-10-05
Attending: INTERNAL MEDICINE | Admitting: INTERNAL MEDICINE
Payer: MEDICARE

## 2023-10-05 ENCOUNTER — ANESTHESIA EVENT (OUTPATIENT)
Dept: ENDOSCOPY | Facility: HOSPITAL | Age: 68
End: 2023-10-05
Payer: MEDICARE

## 2023-10-05 ENCOUNTER — ANESTHESIA (OUTPATIENT)
Dept: ENDOSCOPY | Facility: HOSPITAL | Age: 68
End: 2023-10-05
Payer: MEDICARE

## 2023-10-05 VITALS
OXYGEN SATURATION: 92 % | HEART RATE: 59 BPM | WEIGHT: 195 LBS | TEMPERATURE: 98 F | HEIGHT: 62 IN | SYSTOLIC BLOOD PRESSURE: 113 MMHG | BODY MASS INDEX: 35.88 KG/M2 | RESPIRATION RATE: 16 BRPM | DIASTOLIC BLOOD PRESSURE: 60 MMHG

## 2023-10-05 LAB — GLUCOSE BLD-MCNC: 151 MG/DL (ref 70–99)

## 2023-10-05 PROCEDURE — 0DB78ZX EXCISION OF STOMACH, PYLORUS, VIA NATURAL OR ARTIFICIAL OPENING ENDOSCOPIC, DIAGNOSTIC: ICD-10-PCS | Performed by: INTERNAL MEDICINE

## 2023-10-05 PROCEDURE — 0DB68ZX EXCISION OF STOMACH, VIA NATURAL OR ARTIFICIAL OPENING ENDOSCOPIC, DIAGNOSTIC: ICD-10-PCS | Performed by: INTERNAL MEDICINE

## 2023-10-05 PROCEDURE — 88305 TISSUE EXAM BY PATHOLOGIST: CPT | Performed by: INTERNAL MEDICINE

## 2023-10-05 PROCEDURE — 82962 GLUCOSE BLOOD TEST: CPT

## 2023-10-05 PROCEDURE — 0DB98ZX EXCISION OF DUODENUM, VIA NATURAL OR ARTIFICIAL OPENING ENDOSCOPIC, DIAGNOSTIC: ICD-10-PCS | Performed by: INTERNAL MEDICINE

## 2023-10-05 RX ORDER — SODIUM CHLORIDE, SODIUM LACTATE, POTASSIUM CHLORIDE, CALCIUM CHLORIDE 600; 310; 30; 20 MG/100ML; MG/100ML; MG/100ML; MG/100ML
INJECTION, SOLUTION INTRAVENOUS CONTINUOUS
Status: DISCONTINUED | OUTPATIENT
Start: 2023-10-05 | End: 2023-10-05

## 2023-10-05 RX ORDER — ONDANSETRON 2 MG/ML
4 INJECTION INTRAMUSCULAR; INTRAVENOUS AS NEEDED
Status: DISCONTINUED | OUTPATIENT
Start: 2023-10-05 | End: 2023-10-05

## 2023-10-05 RX ORDER — METOCLOPRAMIDE HYDROCHLORIDE 5 MG/ML
10 INJECTION INTRAMUSCULAR; INTRAVENOUS AS NEEDED
Status: DISCONTINUED | OUTPATIENT
Start: 2023-10-05 | End: 2023-10-05

## 2023-10-05 RX ORDER — NICOTINE POLACRILEX 4 MG
15 LOZENGE BUCCAL
Status: DISCONTINUED | OUTPATIENT
Start: 2023-10-05 | End: 2023-10-05

## 2023-10-05 RX ORDER — NICOTINE POLACRILEX 4 MG
30 LOZENGE BUCCAL
Status: DISCONTINUED | OUTPATIENT
Start: 2023-10-05 | End: 2023-10-05

## 2023-10-05 RX ORDER — LABETALOL HYDROCHLORIDE 5 MG/ML
5 INJECTION, SOLUTION INTRAVENOUS EVERY 5 MIN PRN
Status: DISCONTINUED | OUTPATIENT
Start: 2023-10-05 | End: 2023-10-05

## 2023-10-05 RX ORDER — DEXTROSE MONOHYDRATE 25 G/50ML
50 INJECTION, SOLUTION INTRAVENOUS
Status: DISCONTINUED | OUTPATIENT
Start: 2023-10-05 | End: 2023-10-05

## 2023-10-05 RX ORDER — NALOXONE HYDROCHLORIDE 0.4 MG/ML
80 INJECTION, SOLUTION INTRAMUSCULAR; INTRAVENOUS; SUBCUTANEOUS AS NEEDED
Status: DISCONTINUED | OUTPATIENT
Start: 2023-10-05 | End: 2023-10-05

## 2023-10-05 RX ORDER — LIDOCAINE HYDROCHLORIDE 10 MG/ML
INJECTION, SOLUTION EPIDURAL; INFILTRATION; INTRACAUDAL; PERINEURAL AS NEEDED
Status: DISCONTINUED | OUTPATIENT
Start: 2023-10-05 | End: 2023-10-05 | Stop reason: SURG

## 2023-10-05 RX ADMIN — LIDOCAINE HYDROCHLORIDE 50 MG: 10 INJECTION, SOLUTION EPIDURAL; INFILTRATION; INTRACAUDAL; PERINEURAL at 10:37:00

## 2023-10-05 RX ADMIN — SODIUM CHLORIDE, SODIUM LACTATE, POTASSIUM CHLORIDE, CALCIUM CHLORIDE: 600; 310; 30; 20 INJECTION, SOLUTION INTRAVENOUS at 10:46:00

## 2023-10-05 RX ADMIN — SODIUM CHLORIDE, SODIUM LACTATE, POTASSIUM CHLORIDE, CALCIUM CHLORIDE: 600; 310; 30; 20 INJECTION, SOLUTION INTRAVENOUS at 10:37:00

## 2023-10-05 NOTE — DISCHARGE INSTRUCTIONS
ENDOSCOPY DISCHARGE INSTRUCTIONS    Procedure Performed:   Gastroscopy    Endoscopist: No name on file  FINDINGS:   Hiatal hernia (stomach slides up into chest through diaphragm)  Small esophageal varices    MEDICATIONS:  You may resume all other medications today    DIET:  Resume Normal Diet    BIOPSIES:  Biopsy results will be released to you as soon as they are available as is now the law. This will not allow your physician the opportunity to go over these before they are released to you. It is not necessary to contact the office for explanation of these results. Your physician will contact you within a few business days of their release to review the findings with you    X-RAYS/LABS:   No X-rays/Labs were ordered today    ADDITIONAL RECOMMENDATIONS:    Schedule office visit for December    Activity for remainder of today:    REST TODAY  DO NOT drive or operate heavy machinery  DO NOT drink any alcoholic beverages  DO NOT sign any legal documents or make any important decisions    After your procedure(s): It is not unusual to feel bloated or gassy . Passing gas and belching is encouraged. Lying on your left side with your knees flexed may relieve the discomfort. A hot pack to the abdomen may also help. After your gastroscopy (upper endoscopy): You may experience a slight sore throat which will subside. Throat lozenges or salt water gargle can be used. FOLLOW-UP:  Contact the office at [phone] for follow-up appointment is needed or if you develop any of the following:    Severe abdominal pain/discomfort     Excessive bleeding                     Black tarry stool    Difficulty breathing/swallowing      Persistent nausea/vomiting  Fever above 100 degrees or chills    Home Care Instructions Gastroscopy with Sedation    Diet:  - Resume your regular diet as tolerated unless otherwise instructed. - Start with light meals to minimize bloating.  - Do not drink alcohol today.     Medication:  - If you have questions about resuming your normal medications, please contact your Primary Care Physician. Activities:  - Take it easy today. Do not return to work today. - Do not drive today. - Do not operate any machinery today (including kitchen equipment). Gastroscopy:  - You may have a sore throat for 2-3 days following the exam. This is normal. Gargling with warm salt water (1/2 tsp salt to 1 glass warm water) or using throat lozenges will help. - If you experience any sharp pain in your neck, abdomen or chest, vomiting of blood, oral temperature over 100 degrees Fahrenheit, light-headedness or dizziness, or any other problems, contact your doctor. **If unable to reach your doctor, please go to the [facility] Emergency Room**    - Your referring physician will receive a full report of your examination.  - If you do not hear from your doctor's office within two weeks of your biopsy, please call them for your results.

## 2023-10-05 NOTE — H&P
XIMENA Ghosh 57 Patient Status:  Hospital Outpatient Surgery    1955 MRN [id number]   Location [id number] [place] [address] Attending Noelle Villalta MD   [facility] Day # 0 PCP Nathalia Medina MD     Date:  10/5/2023  Date of Admission:  10/5/2023    History provided by:patient  Chief Complaint:    cirrhosis, hernia      HPI:   Ann Cardozo is a(n) 77year old female. Here for cirrhosis, hernia    History     Past Medical History:   Diagnosis Date    Anxiety     situational    Back problem     Cataract     Degeneration of lumbar or lumbosacral intervertebral disc 2012    Resolved since last addressed 10/31/19    Depression     Diabetes ([address]     DM (diabetes mellitus), type 2 ([address]     Hearing impairment     [place]; uses bilateral hearing aids    Hepatic encephalopathy ([address] 2019    High blood pressure     High cholesterol     HPV (human papilloma virus) infection 2019    HYPOTHYROIDISM     Incontinence     Liver cirrhosis secondary to PRETTY ([address] 2019    Cirrhosis from the CT on [place]. Grade 2 non bleeding varices. Hx HE.   No ascites    Osteoarthritis     Other and unspecified hyperlipidemia     Pulmonary embolism ([address] 2022    Left lung    Shortness of breath     with activity during hot weather    Sleep apnea     not using CPAP anymore    Trochanteric bursitis 2012    Resolved since last addressed 13    Unspecified essential hypertension     Visual impairment     glasses/contact lenses     Past Surgical History:   Procedure Laterality Date    APPENDECTOMY  10-23-12 [facility]    BIOPSY OF BREAST, INCISIONAL            COLONOSCOPY  19= Diverticulosis, Polyps (TA), Hemorrhoids    Repeat     COLONOSCOPY N/A 2022    Procedure: COLONOSCOPY;  Surgeon: Anabelle Arce MD;  Location: [address] [place] [address] ENDOSCOPY    COLONOSCOPY,BIOPSY  9/15/2011    Performed by Ariela ALCANTARA at [facility] COLPOSCOPY,BX CERVIX/ENDOCERV CURR  2017    EGD N/A 2019    Sylvia Knowles MD;  Grade 2 varices, Gastritis. Gastric/SB Bx: Normal    EXCISIONAL BIOPSY LEFT  1988    left axilla    LAMINECTOMY,LUMBAR      NEEDLE BIOPSY LEFT  1988    NEEDLE BIOPSY RIGHT      OTHER      Tummy tuck    OTHER SURGICAL HISTORY      LOWER BACK SURGERY-DISC    TONSILLECTOMY      US ELASTO LIVER PARENCHYMA (TGA=[id number])  19: F-4 cirrhosis     Family History   Problem Relation Age of Onset    Heart Disease Father     Cancer Father         prostate cancer    Heart Disorder Father     Heart Disease Mother     Diabetes Mother     Hypertension Mother     Heart Disorder Mother         enlarged heart, pacemaker    Cancer Sister         uterine cancer    Other (hysterectomy) Sister      Social History:  Social History     Socioeconomic History    Marital status: Single   Tobacco Use    Smoking status: Former     Packs/day: 0.10     Years: 28.00     Additional pack years: 0.00     Total pack years: 2.80     Types: Cigarettes     Start date: 1983     Quit date: 2011     Years since quittin.7    Smokeless tobacco: Former   Vaping Use    Vaping Use: Never used   Substance and Sexual Activity    Alcohol use: Not Currently    Drug use: No    Sexual activity: Not Currently     Partners: Male     Allergies/Medications: Allergies:   Amoxicillin-Na Ki*    ANAPHYLAXIS  Pcn [Penicillins]       HIVES  Lisinopril              Coughing  MILK THISTLE OR, Take by mouth. omeprazole 20 MG Oral Capsule Delayed Release, Take 2 capsules (40 mg total) by mouth every morning before breakfast.  glimepiride 2 MG Oral Tab, Take 1 tablet (2 mg total) by mouth daily with breakfast.  zolpidem 5 MG Oral Tab, Take 1 tablet (5 mg total) by mouth nightly as needed. oxyCODONE-acetaminophen 5-325 MG Oral Tab, Take 1-2 tablets by mouth every 6 (six) hours as needed for Pain.   lactulose 10 GM/15ML Oral Solution, Take 30 mL (20 g total) by mouth 3 (three) times daily. (Patient taking differently: Take 30 mL (20 g total) by mouth 3 (three) times daily. Pt takes only once daily)  PROPRANOLOL 10 MG Oral Tab, TAKE 1 TABLET BY MOUTH THREE TIMES A DAY  gabapentin 300 MG Oral Cap, Take 2 capsules at night   Patient needs appointment prior to next refill  ATORVASTATIN 20 MG Oral Tab, TAKE 1 TABLET BY MOUTH EVERY DAY  METFORMIN HCL 1000 MG Oral Tab, TAKE 1 TABLET BY MOUTH TWICE A DAY  Glucose Blood (ACCU-CHEK GIANA) In Vitro Strip, Use one strip to test blood sugar once daily. Diagnosed E11.9  Multiple Vitamin (MULTI-VITAMIN DAILY) Oral Tab, Take by mouth daily. Cholecalciferol (VITAMIN D) 50 MCG (2000 UT) Oral Cap, Take by mouth daily. SERTRALINE 100 MG Oral Tab, TAKE 1 TABLET BY MOUTH EVERY DAY (Patient taking differently: Take 1.5 tablets (150 mg total) by mouth daily. TAKE 1 TABLET BY MOUTH EVERY DAY)  ACCU-CHEK FASTCLIX LANCETS Does not apply Misc, Once daily  Accu-Chek Softclix Lancets Does not apply Misc, Use to test blood glucose daily. Review of Systems:   Pertinent items are noted in HPI. A comprehensive review of systems was negative. Physical Exam:   Vital Signs:  Height 5' 2\" (1.575 m), weight 195 lb (88.5 kg), last menstrual period 11/04/2009, not currently breastfeeding.      General appearance:  alert, appears stated age, and cooperative  Head: Normocephalic, without obvious abnormality, atraumatic  Pulmonary: clear to auscultation bilaterally  Cardiovascular: S1, S2 normal, no murmur, click, rub or gallop, regular rate and rhythm  Abdominal: soft, non-tender; bowel sounds normal; no masses,  no organomegaly  Extremities: extremities normal, atraumatic, no cyanosis or edema        Results:     Lab Results   Component Value Date    WBC 9.5 07/03/2022    HGB 11.5 (L) 07/03/2022    HCT 37.1 07/03/2022    .0 07/03/2022    CREATSERUM 0.69 07/03/2022    BUN 13 07/03/2022     07/03/2022    K 3.7 07/03/2022     07/03/2022    CO2 27.0 07/03/2022     (H) 07/03/2022    CA 8.4 (L) 07/03/2022    ALB 3.6 07/03/2022    ALKPHO 99 07/03/2022    BILT 0.7 07/03/2022    TP 6.7 07/03/2022    AST 31 07/03/2022    ALT 33 07/03/2022    .2 (H) 07/03/2022    INR 1.0 12/01/2021    T4F 1.17 10/27/2018    TSH 2.720 01/07/2021     03/15/2017    DDIMER 1.36 (H) 07/02/2022    ESRML 16 01/07/2021    CRP 1.48 (H) 03/15/2017    TROP <0.046 03/15/2017    B12 616 01/07/2021    ETOH <3 10/27/2020       No results found.         Assessment/Plan:   Estefania Sanchez MD  10/5/2023

## 2023-10-05 NOTE — ANESTHESIA POSTPROCEDURE EVALUATION
[facility] Patient Status:  Hospital Outpatient Surgery   Age/Gender 77year old female MRN [id number]   Location [address] [address] Attending Ayaka Golden MD   [facility] Day # 0 PCP Sonam Reina MD       Anesthesia Post-op Note    ESOPHAGOGASTRODUODENOSCOPY (EGD) with biopsies    Procedure Summary       Date: 10/05/23 Room / Location: [facility] ENDOSCOPY 03 / 1404 [facility] ENDOSCOPY    Anesthesia Start: 1034 Anesthesia Stop: 9614    Procedure: ESOPHAGOGASTRODUODENOSCOPY (EGD) with biopsies Diagnosis: (Esophageal varices, hiatal hernia)    Surgeons: Ayaka Golden MD Anesthesiologist: Monserrat Roberts MD    Anesthesia Type: MAC ASA Status: 3            Anesthesia Type: MAC    Vitals Value Taken Time   /60 10/05/23 1103   Temp  10/05/23 1103   Pulse 59 10/05/23 1103   Resp 14 10/05/23 1103   SpO2 98% 10/05/23 1103       Patient Location: Endoscopy    Anesthesia Type: MAC    Airway Patency: patent    Postop Pain Control: adequate    Mental Status: preanesthetic baseline    Nausea/Vomiting: none    Cardiopulmonary/Hydration status: stable euvolemic    Complications: no apparent anesthesia related complications    Postop vital signs: stable    Dental Exam: Unchanged from Preop    Patient to be discharged home when criteria met.

## 2024-10-30 ENCOUNTER — APPOINTMENT (OUTPATIENT)
Dept: CT IMAGING | Facility: HOSPITAL | Age: 69
End: 2024-10-30
Attending: EMERGENCY MEDICINE
Payer: MEDICARE

## 2024-10-30 ENCOUNTER — HOSPITAL ENCOUNTER (INPATIENT)
Facility: HOSPITAL | Age: 69
LOS: 2 days | Discharge: HOME | End: 2024-11-02
Attending: EMERGENCY MEDICINE | Admitting: INTERNAL MEDICINE
Payer: MEDICARE

## 2024-10-30 DIAGNOSIS — S06.5XAA ACUTE SUBDURAL HEMATOMA (HCC): Primary | ICD-10-CM

## 2024-10-30 LAB
ALBUMIN SERPL-MCNC: 4.3 G/DL (ref 3.2–4.8)
ALBUMIN/GLOB SERPL: 1.4 {RATIO} (ref 1–2)
ALP LIVER SERPL-CCNC: 129 U/L
ALT SERPL-CCNC: 25 U/L
ANION GAP SERPL CALC-SCNC: 8 MMOL/L (ref 0–18)
APTT PPP: 29.7 SECONDS (ref 23–36)
AST SERPL-CCNC: 42 U/L (ref ?–34)
BASOPHILS # BLD AUTO: 0.04 X10(3) UL (ref 0–0.2)
BASOPHILS NFR BLD AUTO: 0.5 %
BILIRUB SERPL-MCNC: 0.6 MG/DL (ref 0.2–1.1)
BUN BLD-MCNC: 10 MG/DL (ref 9–23)
CALCIUM BLD-MCNC: 9.7 MG/DL (ref 8.7–10.4)
CHLORIDE SERPL-SCNC: 106 MMOL/L (ref 98–112)
CO2 SERPL-SCNC: 27 MMOL/L (ref 21–32)
CREAT BLD-MCNC: 0.64 MG/DL
EGFRCR SERPLBLD CKD-EPI 2021: 94 ML/MIN/1.73M2 (ref 60–?)
EOSINOPHIL # BLD AUTO: 0 X10(3) UL (ref 0–0.7)
EOSINOPHIL NFR BLD AUTO: 0 %
ERYTHROCYTE [DISTWIDTH] IN BLOOD BY AUTOMATED COUNT: 14.4 %
EST. AVERAGE GLUCOSE BLD GHB EST-MCNC: 143 MG/DL (ref 68–126)
GLOBULIN PLAS-MCNC: 3.1 G/DL (ref 2–3.5)
GLUCOSE BLD-MCNC: 157 MG/DL (ref 70–99)
GLUCOSE BLD-MCNC: 79 MG/DL (ref 70–99)
HBA1C MFR BLD: 6.6 % (ref ?–5.7)
HCT VFR BLD AUTO: 37 %
HGB BLD-MCNC: 12.3 G/DL
IMM GRANULOCYTES # BLD AUTO: 0.03 X10(3) UL (ref 0–1)
IMM GRANULOCYTES NFR BLD: 0.4 %
INR BLD: 1.05 (ref 0.8–1.2)
LYMPHOCYTES # BLD AUTO: 1.21 X10(3) UL (ref 1–4)
LYMPHOCYTES NFR BLD AUTO: 16.4 %
MCH RBC QN AUTO: 30.8 PG (ref 26–34)
MCHC RBC AUTO-ENTMCNC: 33.2 G/DL (ref 31–37)
MCV RBC AUTO: 92.5 FL
MONOCYTES # BLD AUTO: 0.7 X10(3) UL (ref 0.1–1)
MONOCYTES NFR BLD AUTO: 9.5 %
NEUTROPHILS # BLD AUTO: 5.41 X10 (3) UL (ref 1.5–7.7)
NEUTROPHILS # BLD AUTO: 5.41 X10(3) UL (ref 1.5–7.7)
NEUTROPHILS NFR BLD AUTO: 73.2 %
OSMOLALITY SERPL CALC.SUM OF ELEC: 290 MOSM/KG (ref 275–295)
PLATELET # BLD AUTO: 151 10(3)UL (ref 150–450)
POTASSIUM SERPL-SCNC: 4.2 MMOL/L (ref 3.5–5.1)
PROT SERPL-MCNC: 7.4 G/DL (ref 5.7–8.2)
PROTHROMBIN TIME: 13.8 SECONDS (ref 11.6–14.8)
RBC # BLD AUTO: 4 X10(6)UL
SODIUM SERPL-SCNC: 141 MMOL/L (ref 136–145)
WBC # BLD AUTO: 7.4 X10(3) UL (ref 4–11)

## 2024-10-30 PROCEDURE — 70450 CT HEAD/BRAIN W/O DYE: CPT | Performed by: EMERGENCY MEDICINE

## 2024-10-30 RX ORDER — DEXTROSE MONOHYDRATE 25 G/50ML
50 INJECTION, SOLUTION INTRAVENOUS
Status: DISCONTINUED | OUTPATIENT
Start: 2024-10-30 | End: 2024-11-02

## 2024-10-30 RX ORDER — ACETAMINOPHEN 500 MG
500 TABLET ORAL EVERY 4 HOURS PRN
Status: DISCONTINUED | OUTPATIENT
Start: 2024-10-30 | End: 2024-11-02

## 2024-10-30 RX ORDER — ZOLPIDEM TARTRATE 10 MG/1
5 TABLET ORAL NIGHTLY PRN
Status: DISCONTINUED | OUTPATIENT
Start: 2024-10-30 | End: 2024-11-02

## 2024-10-30 RX ORDER — CHOLECALCIFEROL (VITAMIN D3) 50 MCG
2000 TABLET ORAL DAILY
Status: DISCONTINUED | OUTPATIENT
Start: 2024-10-31 | End: 2024-11-02

## 2024-10-30 RX ORDER — NICOTINE POLACRILEX 4 MG
15 LOZENGE BUCCAL
Status: DISCONTINUED | OUTPATIENT
Start: 2024-10-30 | End: 2024-11-02

## 2024-10-30 RX ORDER — PROPRANOLOL HYDROCHLORIDE 10 MG/1
10 TABLET ORAL 3 TIMES DAILY
Status: DISCONTINUED | OUTPATIENT
Start: 2024-10-30 | End: 2024-11-02

## 2024-10-30 RX ORDER — METOCLOPRAMIDE HYDROCHLORIDE 5 MG/ML
10 INJECTION INTRAMUSCULAR; INTRAVENOUS EVERY 8 HOURS PRN
Status: DISCONTINUED | OUTPATIENT
Start: 2024-10-30 | End: 2024-11-01

## 2024-10-30 RX ORDER — PANTOPRAZOLE SODIUM 40 MG/1
40 TABLET, DELAYED RELEASE ORAL
Status: DISCONTINUED | OUTPATIENT
Start: 2024-10-31 | End: 2024-11-02

## 2024-10-30 RX ORDER — ONDANSETRON 2 MG/ML
4 INJECTION INTRAMUSCULAR; INTRAVENOUS EVERY 6 HOURS PRN
Status: DISCONTINUED | OUTPATIENT
Start: 2024-10-30 | End: 2024-11-01

## 2024-10-30 RX ORDER — ROSUVASTATIN CALCIUM 5 MG/1
5 TABLET, COATED ORAL NIGHTLY
COMMUNITY

## 2024-10-30 RX ORDER — NICOTINE POLACRILEX 4 MG
30 LOZENGE BUCCAL
Status: DISCONTINUED | OUTPATIENT
Start: 2024-10-30 | End: 2024-11-02

## 2024-10-30 RX ORDER — BUPROPION HYDROCHLORIDE 150 MG/1
150 TABLET ORAL DAILY
Status: DISCONTINUED | OUTPATIENT
Start: 2024-10-31 | End: 2024-11-02

## 2024-10-30 RX ORDER — ATORVASTATIN CALCIUM 20 MG/1
20 TABLET, FILM COATED ORAL NIGHTLY
Status: DISCONTINUED | OUTPATIENT
Start: 2024-10-30 | End: 2024-10-30

## 2024-10-30 RX ORDER — BUPROPION HYDROCHLORIDE 150 MG/1
150 TABLET ORAL DAILY
COMMUNITY
Start: 2024-10-02

## 2024-10-30 RX ORDER — ROSUVASTATIN CALCIUM 5 MG/1
5 TABLET, COATED ORAL NIGHTLY
Status: DISCONTINUED | OUTPATIENT
Start: 2024-10-30 | End: 2024-11-02

## 2024-10-30 RX ORDER — LACTULOSE 10 G/15ML
20 SOLUTION ORAL 3 TIMES DAILY
Status: DISCONTINUED | OUTPATIENT
Start: 2024-10-30 | End: 2024-11-02

## 2024-10-30 NOTE — ED INITIAL ASSESSMENT (HPI)
Pt to ER from behavioral health therapist office. MD reviewed CT head and instructed pt to come to ER and was concerned results had not been communicated to pt. Pt A&O x 3 in triage. Per pt mychart CT results:      \"Moderate Right holohemispheric subdural hematoma with subacute to chronic hemorrhagic products causing mass effect on the right frontoparietal lobes and milk midline shift to the left. Flattening of the right lateral ventricle without ventricular entrapment\"

## 2024-10-30 NOTE — ED PROVIDER NOTES
Patient Seen in: [facility] Emergency Department      History     Chief Complaint   Patient presents with    Nausea/vomiting    Headache     Stated Complaint: \"falling a lot\" nausea/ vomiting, headaches    Subjective:   HPI      69-year-old female has a history of having cirrhosis as well as problems her balance with does fall a lot says that she has been having trouble with headaches and some nausea over the last month.  On 19 October she had spoken with her physician about these and had a CT brain done today.  She went to see her therapist and had a review of her testing and it was noted that she has a subacute subdural hematoma with mass effect noted on her CT was sent to the emergency room.  This but no other new falls.  She is not having a headache at this particular time nor nausea but it does come and go and she does get dizzy spells that come and go.  The patient is having no other new complaint at this particular time.    Objective:     Past Medical History:    Anxiety    situational    Back problem    Cataract    Degeneration of lumbar or lumbosacral intervertebral disc    Resolved since last addressed 10/31/19    Depression    Diabetes (HCC)    DM (diabetes mellitus), type 2 (HCC)    Hearing impairment    [place]; uses bilateral hearing aids    Hepatic encephalopathy (HCC)    High blood pressure    High cholesterol    HPV (human papilloma virus) infection    HYPOTHYROIDISM    Incontinence    Liver cirrhosis secondary to PRETTY (HCC)    Cirrhosis from the CT on [place].  Grade 2 non bleeding varices. Hx HE.  No ascites    Osteoarthritis    Other and unspecified hyperlipidemia    Pulmonary embolism (HCC)    Left lung    Shortness of breath    with activity during hot weather    Sleep apnea    not using CPAP anymore    Trochanteric bursitis    Resolved since last addressed 1/14/13    Unspecified essential hypertension    Visual impairment    glasses/contact lenses              Past Surgical History:    Procedure Laterality Date    Appendectomy  10-23-12 [facility]    Biopsy of breast, incisional            Colonoscopy  19= Diverticulosis, Polyps (TA), Hemorrhoids    Repeat     Colonoscopy N/A 2022    Procedure: COLONOSCOPY;  Surgeon: Salbador Mann MD;  Location: [facility] ENDOSCOPY    Colonoscopy,biopsy  9/15/2011    Performed by SALBADOR MANN at [facility]    Colposcopy,bx cervix/endocerv curr      Egd N/A 2019    Salbador Mann MD;  Grade 2 varices, Gastritis.  Gastric/SB Bx: Normal    Excisional biopsy left      left axilla    Laminectomy,lumbar      Needle biopsy left      Needle biopsy right      Other      Tummy tuck    Other surgical history      LOWER BACK SURGERY-DISC    Tonsillectomy      Us elasto liver parenchyma (ttp=[id number])  19: F-4 cirrhosis                No pertinent social history.                Physical Exam     ED Triage Vitals [10/30/24 1549]   /78   Pulse 59   Resp 18   Temp 98 °F (36.7 °C)   Temp src Temporal   SpO2 90 %   O2 Device None (Room air)       Current Vitals:   Vital Signs  BP: 142/83  Pulse: 64  Resp: 16  Temp: 98 °F (36.7 °C)  Temp src: Temporal  MAP (mmHg): (!) 103    Oxygen Therapy  SpO2: 96 %  O2 Device: None (Room air)        Physical Exam  HEENT : NCAT, EOMI, PEERL,  neck supple, no JVD, trachea midline, No LAD  Heart: S1S2 normal. No murmurs, regular rate and rhythm  Lungs: Clear to auscultation bilaterally  Abdomen: Soft nontender nondistended normal active bowel sounds without rebound, guarding or masses noted  Back nontender without CVA tenderness  Extremity no clubbing, cyanosis or edema noted.  Full range of motion noted without tenderness  Neuro: No focal deficits noted    All measures to prevent infection transmission during my interaction with the patient were taken.  The patient was already wearing droplet mask on my arrival to the room.  Personal protective equipment including a droplet  mask as well as gloves were worn throughout the duration of my exam.  Hand washing was performed prior to and after the exam.  Stethoscope and equipment used during my examination was cleaned with a super Sani cloth germicidal wipe following the exam.    ED Course     Labs Reviewed   COMP METABOLIC PANEL (14) - Abnormal; Notable for the following components:       Result Value    AST 42 (*)     All other components within normal limits   PROTHROMBIN TIME (PT) - Normal   PTT, ACTIVATED - Normal   CBC WITH DIFFERENTIAL WITH PLATELET       ED Course as of 10/30/24 1802  ------------------------------------------------------------  Time: 10/30 1800  Comment: While here the patient's CMP was unremarkable.  The patient CBC was normal.  The PT PTT was normal.  She had a CT of the brain that I interpreted consistent with a subdural acute on chronic hematoma.  Read the radiology report and did speak with radiology.  Spoke with the hospitalist as well as neurosurgery and the patient will be admitted for further management this time.       CT BRAIN OR HEAD (CPT=70450)    Result Date: 10/30/2024  PROCEDURE:  CT BRAIN OR HEAD (05390)  COMPARISON:  [facility] , CT, CT BRAIN OR HEAD (45563), 10/27/2020, 4:55 PM.  INDICATIONS:  falling a lot nausea/ vomiting, headaches  TECHNIQUE:  Noncontrast CT scanning is performed through the brain. Dose reduction techniques were used. Dose information is transmitted to the ACR (American College of Radiology) NRDR (National Radiology Data Registry) which includes the Dose Index Registry.  PATIENT STATED HISTORY: (As transcribed by Technologist)  falling a lot nausea/ vomiting, headaches    FINDINGS:  Overall ventricles and sulci are normal caliber allowing for age.  There is a new extra-axial fluid collection.  The majority is low attenuation.  There are some small areas of high attenuation blood.  Maximal thickness is measured 1.4 cm.  There is flattening of the subjacent cerebrum.  0.2 cm right  to left midline shift.  No intraparenchymal or intraventricular blood.  Maintained gray-white matter differentiation.  The visualized paranasal sinuses and mastoid air cells are satisfactorily aerated.               CONCLUSION:  Acute on chronic right subdural hematoma.  Details as above.  Critical value test result called to Dr. Dunn in the ED with accurate read back.  1730 hours.    LOCATION:  [facility]   Dictated by (CST): Fidel Aguero MD on 10/30/2024 at 5:32 PM     Finalized by (CST): Fidel Aguero MD on 10/30/2024 at 5:35 PM               MDM      Differential diagnosis included concussion, intracranial hemorrhage, migraine but not limited such.  Patient here does have an acute component of a subdural hematoma and associated with chronic hematoma with shift.  I spoke with the hospitalist as well as neurosurgery and this time the patient admitted for further management      Critical Care Note:  The patient arrived with a condition with significant morbidity and mortality associated. The services I provided  were to promote improvement and reduce mortality specifically involving complex record review, complex medical decisions and interventions, and consultations outside the regular procedures and care normally rendered for 45 minutes of critical care time      This note was prepared using Dragon Medical voice recognition dictation software.  As a result errors may occur.  When identified to these areas have been corrected.  While every attempt is made to correct errors during dictation discrepancies may still exist.  Please contact if there are any errors.    Admission disposition: 10/30/2024  6:02 PM           Medical Decision Making      Disposition and Plan     Clinical Impression:  1. Acute subdural hematoma (HCC)         Disposition:  Admit  10/30/2024  6:02 pm    Follow-up:  No follow-up provider specified.        Medications Prescribed:  Current Discharge Medication List              Supplementary  Documentation:         Hospital Problems       Present on Admission  Date Reviewed: 3/29/2022            ICD-10-CM Noted POA    * (Principal) Acute subdural hematoma (HCC) S06.5XAA 10/30/2024 Unknown

## 2024-10-30 NOTE — H&P
[facility]   part of [facility]    History and Physical     Ivanna Meyer Patient Status:  Emergency    1955 MRN [id number]   Location [facility] EMERGENCY DEPARTMENT Attending Dave Dunn MD   Hosp Day # 0 PCP Demarcus Mcrae MD     Chief Complaint: Headache, nausea, abnormal CT    History of Present Illness: Ivanna Meyer is a 69 year old female with anxiety, depression, type 2 DM, htn, hld, hypothyroidism, hx hepatic encephalopathy, liver cirrhosis secondary to PRETTY, hx PE and FREDO not on CPAP presenting with headache, nausea and abnormal CT head. Patient says that for a couple months she had had on and off headaches followed by nausea over the last few weeks. Patient actually was in the IM department for falls and confusion at which time a ct brain was ordered. It appears she completed it a couple weeks latter at which time it showed subacute to chronic subdural hematoma. Patient says she was not notified of this. She was at her psychiatrist office today who noticed these results and directed the patient to the ER for further evaluation given her continued symptoms. Patient denies any other significant positive review of systems.     Past Medical History:  Past Medical History:    Anxiety    situational    Back problem    Cataract    Degeneration of lumbar or lumbosacral intervertebral disc    Resolved since last addressed 10/31/19    Depression    Diabetes (HCC)    DM (diabetes mellitus), type 2 (HCC)    Hearing impairment    [place]; uses bilateral hearing aids    Hepatic encephalopathy (HCC)    High blood pressure    High cholesterol    HPV (human papilloma virus) infection    HYPOTHYROIDISM    Incontinence    Liver cirrhosis secondary to PRETTY (HCC)    Cirrhosis from the CT on [place].  Grade 2 non bleeding varices. Hx HE.  No ascites    Osteoarthritis    Other and unspecified hyperlipidemia    Pulmonary embolism (HCC)    Left lung    Shortness of breath     with activity during hot weather    Sleep apnea    not using CPAP anymore    Trochanteric bursitis    Resolved since last addressed 13    Unspecified essential hypertension    Visual impairment    glasses/contact lenses        Past Surgical History:   Past Surgical History:   Procedure Laterality Date    Appendectomy  10-23-12 [facility]    Biopsy of breast, incisional            Colonoscopy  19= Diverticulosis, Polyps (TA), Hemorrhoids    Repeat     Colonoscopy N/A 2022    Procedure: COLONOSCOPY;  Surgeon: Salbador Mann MD;  Location: [facility] ENDOSCOPY    Colonoscopy,biopsy  9/15/2011    Performed by SALBADOR MANN at [facility] [place], [facility]    Colposcopy,bx cervix/endocerv curr      Egd N/A 2019    Salbador Mann MD;  Grade 2 varices, Gastritis.  Gastric/SB Bx: Normal    Excisional biopsy left      left axilla    Laminectomy,lumbar      Needle biopsy left      Needle biopsy right      Other      Tummy tuck    Other surgical history      LOWER BACK SURGERY-DISC    Tonsillectomy      Us elasto liver parenchyma (heh=[id number])  19: F-4 cirrhosis       Social History:  reports that she quit smoking about 13 years ago. Her smoking use included cigarettes. She started smoking about 41 years ago. She has a 2.8 pack-year smoking history. She has quit using smokeless tobacco. She reports that she does not currently use alcohol. She reports that she does not use drugs.no illegal drugs, not , 2 children, live with care giver, use a cane and walker    Family History:   Family History   Problem Relation Age of Onset    Heart Disease Father     Cancer Father         prostate cancer    Heart Disorder Father     Heart Disease Mother     Diabetes Mother     Hypertension Mother     Heart Disorder Mother         enlarged heart, pacemaker    Cancer Sister         uterine cancer    Other (hysterectomy) Sister    Mother passed  Father passed    Allergies:  Allergies[1]    Medications:  Medications Ordered Prior to Encounter[2]    Review of Systems:   A comprehensive 14 point review of systems was completed.    Pertinent positives and negatives noted in the HPI.    Physical Exam:    /83   Pulse 64   Temp 98 °F (36.7 °C) (Temporal)   Resp 16   Wt 195 lb 1.7 oz (88.5 kg)   LMP 11/04/2009   SpO2 96%   BMI 35.69 kg/m²   General: No acute distress. Alert and oriented  HEENT: Normocephalic atraumatic. Moist mucous membranes. EOM-I.   Neck: No JVD. No carotid bruits.  Respiratory: Clear to auscultation bilaterally. No wheezes. No crackles  Cardiovascular: S1, S2. Regular rate and rhythm. No murmurs  Chest and Back: No tenderness or deformity.  Abdomen: Soft, nontender, nondistended.  Positive bowel sounds. No rebound, guarding   Neurologic: No focal neurological deficits. CNII-XII grossly intact. Sensation and strength intact  Musculoskeletal: Moves all extremities.  Extremities: No edema or tenderness of the LE  Integument: No new rashes or lesions.   Psychiatric: Appropriate mood and affect.      Diagnostic Data:      Labs:  Recent Labs   Lab 10/30/24  1600   WBC 7.4   HGB 12.3   MCV 92.5   .0   INR 1.05       Recent Labs   Lab 10/30/24  1600   GLU 79   BUN 10   CREATSERUM 0.64   CA 9.7   ALB 4.3      K 4.2      CO2 27.0   ALKPHO 129   AST 42*   ALT 25   BILT 0.6   TP 7.4       CrCl cannot be calculated (Unknown ideal weight.).    Recent Labs   Lab 10/30/24  1600   PTP 13.8   INR 1.05       No results for input(s): \"TROP\", \"CK\" in the last 168 hours.    Imaging: Imaging data reviewed in Epic.      ASSESSMENT / PLAN:   69 year old female with anxiety, depression, type 2 DM, htn, hld, hypothyroidism, hx hepatic encephalopathy, liver cirrhosis secondary to PRETTY, hx PE and FREDO not on CPAP presenting with headache, nausea and abnormal CT head.     Acute on Chronic SDH  -etiology uncertain  -likely cause of headache and nausea  -possibly secondary  to falls  -no AC  -neuro surgery consulted  -neuro checks  -likely need repeat CT in am to monitor for stability   -hold an sedative meds    Type 2 DM  -hold home oral meds  -low dose insulin sliding scale    HTN  -propranolol     HLD  -atorvastatin     Anxiety  Depression  -sertraline  -Bupropion     Hx Liver cirrhosis  Hx hepatic encephalopathy   -lactulose  -propranolol   -pantoprazole    Quality:  DVT Prophylaxis: scd  CODE status: Full per chart  Cobb: none    Plan of care discussed with patient and staff    Dispo: no discharge    MD Corinne Denson Hospitalist  [phone]                           [1]   Allergies  Allergen Reactions    Amoxicillin-Na Benzoate ANAPHYLAXIS    Pcn [Penicillins] HIVES    Lisinopril Coughing   [2]   No current facility-administered medications on file prior to encounter.     Current Outpatient Medications on File Prior to Encounter   Medication Sig Dispense Refill    MILK THISTLE OR Take by mouth.      omeprazole 20 MG Oral Capsule Delayed Release Take 2 capsules (40 mg total) by mouth every morning before breakfast.      glimepiride 2 MG Oral Tab Take 1 tablet (2 mg total) by mouth daily with breakfast.      zolpidem 5 MG Oral Tab Take 1 tablet (5 mg total) by mouth nightly as needed. 10 tablet 0    oxyCODONE-acetaminophen 5-325 MG Oral Tab Take 1-2 tablets by mouth every 6 (six) hours as needed for Pain. 15 tablet 0    lactulose 10 GM/15ML Oral Solution Take 30 mL (20 g total) by mouth 3 (three) times daily. (Patient taking differently: Take 30 mL (20 g total) by mouth 3 (three) times daily. Pt takes only once daily) 2700 mL 1    SERTRALINE 100 MG Oral Tab TAKE 1 TABLET BY MOUTH EVERY DAY (Patient taking differently: Take 1.5 tablets (150 mg total) by mouth daily. TAKE 1 TABLET BY MOUTH EVERY DAY) 90 tablet 0    PROPRANOLOL 10 MG Oral Tab TAKE 1 TABLET BY MOUTH THREE TIMES A  tablet 0    gabapentin 300 MG Oral Cap Take 2 capsules at night   Patient needs  appointment prior to next refill 180 capsule 0    ATORVASTATIN 20 MG Oral Tab TAKE 1 TABLET BY MOUTH EVERY DAY 90 tablet 0    ACCU-CHEK FASTCLIX LANCETS Does not apply Misc Once daily 100 each 3    METFORMIN HCL 1000 MG Oral Tab TAKE 1 TABLET BY MOUTH TWICE A  tablet 0    Glucose Blood (ACCU-CHEK GIANA) In Vitro Strip Use one strip to test blood sugar once daily.  Diagnosed E11.9 100 strip 3    Multiple Vitamin (MULTI-VITAMIN DAILY) Oral Tab Take by mouth daily.        Cholecalciferol (VITAMIN D) 50 MCG (2000 UT) Oral Cap Take by mouth daily.        Accu-Chek Softclix Lancets Does not apply Misc Use to test blood glucose daily. 100 each 0

## 2024-10-30 NOTE — ED QUICK NOTES
Rounding Completed    Plan of Care reviewed. Waiting for CT.  Elimination needs assessed.    Bed is locked and in lowest position. Call light within reach.

## 2024-10-30 NOTE — ED QUICK NOTES
Orders for admission, patient is aware of plan and ready to go upstairs. Any questions, please call ED RN Eda at extension [phone].     Patient Covid vaccination status: Fully vaccinated     COVID Test Ordered in ED: None    COVID Suspicion at Admission: N/A    Running Infusions:  None    Mental Status/LOC at time of transport: a/ox4    Other pertinent information:   CIWA score: N/A   NIH score:  N/A

## 2024-10-31 ENCOUNTER — APPOINTMENT (OUTPATIENT)
Dept: CT IMAGING | Facility: HOSPITAL | Age: 69
End: 2024-10-31
Attending: PHYSICIAN ASSISTANT
Payer: MEDICARE

## 2024-10-31 ENCOUNTER — ANESTHESIA EVENT (OUTPATIENT)
Dept: INTERVENTIONAL RADIOLOGY/VASCULAR | Facility: HOSPITAL | Age: 69
End: 2024-10-31
Payer: MEDICARE

## 2024-10-31 LAB
ALBUMIN SERPL-MCNC: 3.9 G/DL (ref 3.2–4.8)
ALBUMIN/GLOB SERPL: 1.3 {RATIO} (ref 1–2)
ALP LIVER SERPL-CCNC: 115 U/L
ALT SERPL-CCNC: 21 U/L
ANION GAP SERPL CALC-SCNC: 7 MMOL/L (ref 0–18)
AST SERPL-CCNC: 44 U/L (ref ?–34)
BASOPHILS # BLD AUTO: 0.03 X10(3) UL (ref 0–0.2)
BASOPHILS NFR BLD AUTO: 0.6 %
BILIRUB SERPL-MCNC: 0.6 MG/DL (ref 0.2–1.1)
BUN BLD-MCNC: 9 MG/DL (ref 9–23)
CALCIUM BLD-MCNC: 9.7 MG/DL (ref 8.7–10.4)
CHLORIDE SERPL-SCNC: 107 MMOL/L (ref 98–112)
CO2 SERPL-SCNC: 23 MMOL/L (ref 21–32)
CREAT BLD-MCNC: 0.63 MG/DL
EGFRCR SERPLBLD CKD-EPI 2021: 95 ML/MIN/1.73M2 (ref 60–?)
EOSINOPHIL # BLD AUTO: 0 X10(3) UL (ref 0–0.7)
EOSINOPHIL NFR BLD AUTO: 0 %
ERYTHROCYTE [DISTWIDTH] IN BLOOD BY AUTOMATED COUNT: 14.2 %
GLOBULIN PLAS-MCNC: 3 G/DL (ref 2–3.5)
GLUCOSE BLD-MCNC: 118 MG/DL (ref 70–99)
GLUCOSE BLD-MCNC: 136 MG/DL (ref 70–99)
GLUCOSE BLD-MCNC: 140 MG/DL (ref 70–99)
GLUCOSE BLD-MCNC: 156 MG/DL (ref 70–99)
GLUCOSE BLD-MCNC: 159 MG/DL (ref 70–99)
HCT VFR BLD AUTO: 35.1 %
HGB BLD-MCNC: 11.8 G/DL
IMM GRANULOCYTES # BLD AUTO: 0.03 X10(3) UL (ref 0–1)
IMM GRANULOCYTES NFR BLD: 0.6 %
LYMPHOCYTES # BLD AUTO: 0.84 X10(3) UL (ref 1–4)
LYMPHOCYTES NFR BLD AUTO: 16 %
MAGNESIUM SERPL-MCNC: 1.8 MG/DL (ref 1.6–2.6)
MCH RBC QN AUTO: 30.9 PG (ref 26–34)
MCHC RBC AUTO-ENTMCNC: 33.6 G/DL (ref 31–37)
MCV RBC AUTO: 91.9 FL
MONOCYTES # BLD AUTO: 0.44 X10(3) UL (ref 0.1–1)
MONOCYTES NFR BLD AUTO: 8.4 %
NEUTROPHILS # BLD AUTO: 3.91 X10 (3) UL (ref 1.5–7.7)
NEUTROPHILS # BLD AUTO: 3.91 X10(3) UL (ref 1.5–7.7)
NEUTROPHILS NFR BLD AUTO: 74.4 %
OSMOLALITY SERPL CALC.SUM OF ELEC: 285 MOSM/KG (ref 275–295)
PLATELET # BLD AUTO: 119 10(3)UL (ref 150–450)
POTASSIUM SERPL-SCNC: 4.5 MMOL/L (ref 3.5–5.1)
PROT SERPL-MCNC: 6.9 G/DL (ref 5.7–8.2)
RBC # BLD AUTO: 3.82 X10(6)UL
SODIUM SERPL-SCNC: 137 MMOL/L (ref 136–145)
WBC # BLD AUTO: 5.3 X10(3) UL (ref 4–11)

## 2024-10-31 PROCEDURE — 99223 1ST HOSP IP/OBS HIGH 75: CPT | Performed by: NEUROLOGICAL SURGERY

## 2024-10-31 PROCEDURE — 70496 CT ANGIOGRAPHY HEAD: CPT | Performed by: PHYSICIAN ASSISTANT

## 2024-10-31 PROCEDURE — 70498 CT ANGIOGRAPHY NECK: CPT | Performed by: PHYSICIAN ASSISTANT

## 2024-10-31 RX ORDER — OXYCODONE AND ACETAMINOPHEN 5; 325 MG/1; MG/1
1 TABLET ORAL EVERY 6 HOURS PRN
Status: DISCONTINUED | OUTPATIENT
Start: 2024-10-31 | End: 2024-11-02

## 2024-10-31 RX ORDER — OXYCODONE AND ACETAMINOPHEN 5; 325 MG/1; MG/1
1-2 TABLET ORAL EVERY 6 HOURS PRN
Status: DISCONTINUED | OUTPATIENT
Start: 2024-10-31 | End: 2024-10-31 | Stop reason: SDUPTHER

## 2024-10-31 RX ORDER — MAGNESIUM OXIDE 400 MG/1
400 TABLET ORAL ONCE
Status: COMPLETED | OUTPATIENT
Start: 2024-10-31 | End: 2024-10-31

## 2024-10-31 RX ORDER — OXYCODONE AND ACETAMINOPHEN 5; 325 MG/1; MG/1
2 TABLET ORAL EVERY 6 HOURS PRN
Status: DISCONTINUED | OUTPATIENT
Start: 2024-10-31 | End: 2024-11-02

## 2024-10-31 NOTE — PROGRESS NOTES
[facility]   part of [facility]     Hospitalist Progress Note     Ivanna Meyer Patient Status:  Inpatient    1955 MRN [id number]   Location [facility] 3NE-A Attending Yaw Brand, DO   Hosp Day # 0 PCP Demarcus Mcrae MD     Chief Complaint: Headache nausea abnormal CT    Subjective:     Patient doing well, headache stable controlled at this time no nausea    Objective:    Review of Systems:   A comprehensive review of systems was completed; pertinent positive and negatives stated in subjective.    Vital signs:  Temp:  [98 °F (36.7 °C)-99.2 °F (37.3 °C)] 98 °F (36.7 °C)  Pulse:  [57-75] 57  Resp:  [16-19] 19  BP: (121-155)/(61-83) 135/72  SpO2:  [90 %-97 %] 92 %    General: No acute distress. Alert and oriented  HEENT: Normocephalic atraumatic. Moist mucous membranes. EOM-I.   Neck: No JVD. No carotid bruits.  Respiratory: Clear to auscultation bilaterally. No wheezes. No crackles  Cardiovascular: S1, S2. Regular rate and rhythm. No murmurs  Chest and Back: No tenderness or deformity.  Abdomen: Soft, nontender, nondistended.  Positive bowel sounds. No rebound, guarding   Neurologic: No focal neurological deficits. CNII-XII grossly intact. Sensation and strength intact  Musculoskeletal: Moves all extremities.  Extremities: No edema or tenderness of the LE  Integument: No new rashes or lesions.   Psychiatric: Appropriate mood and affect.        Diagnostic Data:    Labs:  Recent Labs   Lab 10/30/24  1600 10/31/24  1026   WBC 7.4 5.3   HGB 12.3 11.8*   MCV 92.5 91.9   .0 119.0*   INR 1.05  --        Recent Labs   Lab 10/30/24  1600 10/31/24  0721   GLU 79 136*   BUN 10 9   CREATSERUM 0.64 0.63   CA 9.7 9.7   ALB 4.3 3.9    137   K 4.2 4.5    107   CO2 27.0 23.0   ALKPHO 129 115   AST 42* 44*   ALT 25 21   BILT 0.6 0.6   TP 7.4 6.9       CrCl cannot be calculated (Unknown ideal weight.).    No results for input(s): \"TROP\", \"TROPHS\", \"CK\" in the last 168  hours.    Recent Labs   Lab 10/30/24  1600   PTP 13.8   INR 1.05                  Microbiology    No results found for this visit on 10/30/24.      Imaging: Reviewed in Epic.    Medications:    cholecalciferol  2,000 Units Oral Daily    lactulose  20 g Oral TID    pantoprazole  40 mg Oral QAM AC    propranolol  10 mg Oral TID    sertraline  150 mg Oral Daily    insulin aspart  1-5 Units Subcutaneous TID AC and HS    buPROPion ER  150 mg Oral Daily    rosuvastatin  5 mg Oral Nightly       Assessment & Plan:        69 year old female with anxiety, depression, type 2 DM, htn, hld, hypothyroidism, hx hepatic encephalopathy, liver cirrhosis secondary to PRETTY, hx PE and FREDO not on CPAP presenting with headache, nausea and abnormal CT head.      Acute on Chronic SDH  -etiology uncertain  -likely cause of headache and nausea  -possibly secondary to falls  -no AC, holding all antiplatelets and anticoagulants at this time  -neuro surgery consulted, recommend MMA embolization tomorrow with  n.p.o. past midnight  -neuro checks  Repeat CT reviewed today, shows stable size makes attenuation subdural hemorrhage in the right hemisphere, mild irregularity of the distal left vertebral artery could reflect changes of fibromuscular dysplasia  -hold an sedative meds     Type 2 DM  -hold home oral meds  -low dose insulin sliding scale     HTN  -propranolol   -BP management per neurosurgery currently well-controlled at this time     HLD  -atorvastatin      Anxiety  Depression  -sertraline  -Bupropion      Hx Liver cirrhosis  Hx hepatic encephalopathy   -lactulose  -propranolol   -pantoprazole    Patrick Kamara MD    Supplementary Documentation:     Quality:  DVT Mechanical Prophylaxis:   SCDs,    DVT Pharmacologic Prophylaxis   Medication   None                Code Status: Full Code  Cobb: No urinary catheter in place  Cobb Duration (in days):   Central line:    ELENA:       The 21st Century Cures Act makes medical notes  like these available to patients in the interest of transparency. Please be advised this is a medical document. Medical documents are intended to carry relevant information, facts as evident, and the clinical opinion of the practitioner. The medical note is intended as peer to peer communication and may appear blunt or direct. It is written in medical language and may contain abbreviations or verbiage that are unfamiliar.              **Certification      PHYSICIAN Certification of Need for Inpatient Hospitalization - Initial Certification    Patient will require inpatient services that will reasonably be expected to span two midnight's based on the clinical documentation in H+P.   Based on patients current state of illness, I anticipate that, after discharge, patient will require TBD.

## 2024-10-31 NOTE — PHYSICAL THERAPY NOTE
PHYSICAL THERAPY EVALUATION - INPATIENT     Room Number: 3622/3622-A  Evaluation Date: 10/31/2024  Type of Evaluation: Initial  Physician Order: PT Eval and Treat    Presenting Problem: Acute Subdural Hematoma  Co-Morbidities : HTN, anxiety, DM II, Hypothyroidism  Reason for Therapy: Mobility Dysfunction and Discharge Planning    CT Brain 10/30  CONCLUSION:  Acute on chronic right subdural hematoma.  Details as above.      Critical value test result called to Dr. Dunn in the ED with accurate read back.  1730 hours.     PHYSICAL THERAPY ASSESSMENT   Patient is a 69 year old female admitted 10/30/2024 for  Acute Subdural Hematoma.  Prior to admission, patient's baseline is Mod I.  Patient is currently functioning near baseline with bed mobility, transfers, and gait.  Patient is requiring contact guard assist as a result of the following impairments: decreased endurance/aerobic capacity and decreased muscular endurance.  Physical Therapy will continue to follow for duration of hospitalization.    Patient will benefit from continued skilled PT Services at discharge to promote prior level of function.  Anticipate patient will return home with home health PT.    PLAN DURING HOSPITALIZATION  Nursing Mobility Recommendation : 1 Assist  PT Device Recommendation: Rolling walker  PT Treatment Plan: Bed mobility;Body mechanics;Gait training;Strengthening;Stair training;Transfer training;Balance training  Rehab Potential : Good  Frequency (Obs): 3-5x/week     CURRENT GOALS    Goal #1 Patient is able to demonstrate supine - sit EOB @ level: supervision     Goal #2 Patient is able to demonstrate transfers EOB to/from BSC at assistance level: supervision     Goal #3 Patient is able to ambulate 150 feet with assist device: walker - rolling at assistance level: supervision     Goal #4    Goal #5    Goal #6    Goal Comments: Goals established on 10/31/2024      PHYSICAL THERAPY MEDICAL/SOCIAL HISTORY  History related to current  admission: Patient is a 69 year old female admitted on 10/30/2024 from Home for decrease balance, headaches and nausea.  Pt diagnosed with Acute Subdural Hematoma.    HOME SITUATION  Type of Home: House  Home Layout: Multi-level                     Lives With: Other (Comment) (ex-)    Drives: No          Prior Level of [place]: Pt reports that she lives in a house with her ex-spouse. Pt states that recently she has been needing assistance with her ADLs and requires the use of the RW to ambulate. Pt states that her ex- is her caregiver. Pt reports that she has had multiple of falls that comes unexpectedly.  Pt reports that she at times has memory problems.    SUBJECTIVE  \"I don't know why I fall a lot\"    OBJECTIVE  Precautions: None  Fall Risk: Standard fall risk    WEIGHT BEARING RESTRICTION     PAIN ASSESSMENT  Ratin  Location: Pt reports no pain       COGNITION  Overall Cognitive Status:  WFL - within functional limits    RANGE OF MOTION AND STRENGTH ASSESSMENT  Upper extremity ROM and strength are within functional limits     Lower extremity ROM is within functional limits     Lower extremity strength is within functional limits     BALANCE  Static Sitting: Fair +  Dynamic Sitting: Fair +  Static Standing: Fair  Dynamic Standing: Fair -    ADDITIONAL TESTS  Additional Tests: Modified Drew              Modified Drew: 2                  ACTIVITY TOLERANCE                         O2 WALK       NEUROLOGICAL FINDINGS  Neurological Findings: Sensation;Coordination - Rapid Alternating Movement        Coordination - Rapid Alternating Movement: Symmetrical  Sensation: Symmetrical         AM-PAC '6-Clicks' INPATIENT SHORT FORM - BASIC MOBILITY  How much difficulty does the patient currently have...  Patient Difficulty: Turning over in bed (including adjusting bedclothes, sheets and blankets)?: A Little   Patient Difficulty: Sitting down on and standing up from a chair with arms (e.g.,  wheelchair, bedside commode, etc.): A Little   Patient Difficulty: Moving from lying on back to sitting on the side of the bed?: A Little   How much help from another person does the patient currently need...   Help from Another: Moving to and from a bed to a chair (including a wheelchair)?: A Little   Help from Another: Need to walk in hospital room?: A Little   Help from Another: Climbing 3-5 steps with a railing?: A Little     AM-PAC Score:  Raw Score: 18   Approx Degree of Impairment: 46.58%   Standardized Score (AM-PAC Scale): 43.63   CMS Modifier (G-Code): CK    FUNCTIONAL ABILITY STATUS  Gait Assessment   Functional Mobility/Gait Assessment  Gait Assistance: Contact guard assist  Distance (ft): 75  Assistive Device: Rolling walker  Pattern: Shuffle    Skilled Therapy Provided     Bed Mobility:  Rolling: NT  Supine to sit: SBA   Sit to supine: NT     Transfer Mobility:  Sit to stand: SBA   Stand to sit: SBA  Gait = CGA 75ft using RW.    Therapist's Comments: While ambulating pt was observed with no LOB however pt did demonstrate a decrease in gait speed. Pt reported no dizziness or lightheadness during session.     Exercise/Education Provided:  Bed mobility  Body mechanics  Gait training  Transfer training    Patient End of Session: Up in chair;Needs met;Call light within reach;RN aware of session/findings;All patient questions and concerns addressed;Alarm set;Family present      Patient Evaluation Complexity Level:  History Moderate - 1 or 2 personal factors and/or co-morbidities   Examination of body systems Low -  addressing 1-2 elements   Clinical Presentation Low- Stable   Clinical Decision Making Low Complexity       PT Session Time: 23 minutes  Therapeutic Activity: 15 minutes

## 2024-10-31 NOTE — PLAN OF CARE
Assumed care @ 1930  A&ox4  RA  NSR on tele   NeuroQ 4  Carb control diet QID accucheck  Non cardiac protocol  Ambulates w walker and standby to the bathroom  PIV left antecubital saline locked  Meds in MAR given  Continuing to meet pt needs  Need further details reference flowsheets   Problem: Diabetes/Glucose Control  Goal: Glucose maintained within prescribed range  Description: INTERVENTIONS:  - Monitor Blood Glucose as ordered  - Assess for signs and symptoms of hyperglycemia and hypoglycemia  - Administer ordered medications to maintain glucose within target range  - Assess barriers to adequate nutritional intake and initiate nutrition consult as needed  - Instruct patient on self management of diabetes  Outcome: Progressing   als for specific interventions  Outcome: Progressing

## 2024-10-31 NOTE — CM/SW NOTE
10/31/24 1600   CM/SW Referral Data   Referral Source Social Work (self-referral)   Reason for Referral Discharge planning   Informant EMR;Clinical Staff Member     Pt admitted to [facility] due to SDH- she will have MMA embolization with neurosurgery tomorrow.    PT recs [facility]. Referral in aidin- will follow up with pt re: list/choice once responses received.    Per PT:    HOME SITUATION  Type of Home: House  Home Layout: Multi-level                Lives With: Other (Comment) (ex-)    Drives: No         Prior Level of [place]: Pt reports that she lives in a house with her ex-spouse. Pt states that recently she has been needing assistance with her ADLs and requires the use of the RW to ambulate. Pt states that her ex- is her caregiver. Pt reports that she has had multiple of falls that comes unexpectedly.  Pt reports that she at times has memory problems.    Shelli [facility] LCSW  /Discharge Planner

## 2024-10-31 NOTE — ANESTHESIA PREPROCEDURE EVALUATION
PRE-OP EVALUATION    Patient Name: Ivanna Meyer    Admit Diagnosis: Acute subdural hematoma (HCC) [S06.5XAA]    Pre-op Diagnosis: * No surgery found *        Anesthesia Procedure: IVS NEURO    * Surgery not found *    Pre-op vitals reviewed.  Temp: 98.1 °F (36.7 °C)  Pulse: 59  Resp: 19  BP: 124/54  SpO2: 94 %  Body mass index is 35.67 kg/m².    Current medications reviewed.  Hospital Medications:   [COMPLETED] iopamidol 76% (ISOVUE-370) injection for power injector  60 mL Intravenous ONCE PRN    cholecalciferol (Vitamin D3) tab 2,000 Units  2,000 Units Oral Daily    lactulose (CHRONULAC) 10 GM/15ML solution 20 g  20 g Oral TID    pantoprazole (Protonix) DR tab 40 mg  40 mg Oral QAM AC    propranolol (Inderal) tab 10 mg  10 mg Oral TID    sertraline (Zoloft) tab 150 mg  150 mg Oral Daily    glucose (Dex4) 15 GM/59ML oral liquid 15 g  15 g Oral Q15 Min PRN    Or    glucose (Glutose) 40% oral gel 15 g  15 g Oral Q15 Min PRN    Or    glucose-vitamin C (Dex-4) chewable tab 4 tablet  4 tablet Oral Q15 Min PRN    Or    dextrose 50% injection 50 mL  50 mL Intravenous Q15 Min PRN    Or    glucose (Dex4) 15 GM/59ML oral liquid 30 g  30 g Oral Q15 Min PRN    Or    glucose (Glutose) 40% oral gel 30 g  30 g Oral Q15 Min PRN    Or    glucose-vitamin C (Dex-4) chewable tab 8 tablet  8 tablet Oral Q15 Min PRN    acetaminophen (Tylenol Extra Strength) tab 500 mg  500 mg Oral Q4H PRN    ondansetron (Zofran) 4 MG/2ML injection 4 mg  4 mg Intravenous Q6H PRN    metoclopramide (Reglan) 5 mg/mL injection 10 mg  10 mg Intravenous Q8H PRN    insulin aspart (NovoLOG) 100 Units/mL FlexPen 1-5 Units  1-5 Units Subcutaneous TID AC and HS    buPROPion ER (Wellbutrin XL) 24 hr tab 150 mg  150 mg Oral Daily    zolpidem (Ambien) tab 5 mg  5 mg Oral Nightly PRN    rosuvastatin (Crestor) tab 5 mg  5 mg Oral Nightly       Outpatient Medications:   Prescriptions Prior to Admission[1]    Allergies: Amoxicillin-na benzoate, Pcn  [penicillins], and Lisinopril      Anesthesia Evaluation    Patient summary reviewed.    Anesthetic Complications  (-) history of anesthetic complications         GI/Hepatic/Renal  Comment: Liver cirrhosis secondary to PRETTY              (+) liver disease                 Cardiovascular  Comment:   Portal hypertension    ECHO 22  Study Conclusions   1. Left ventricle: The cavity size was normal. Wall thickness was      normal. Systolic function was normal. The estimated ejection      fraction was 65-70%. Wall motion was normal; there were no      regional wall motion abnormalities. Features are consistent with      a pseudonormal left ventricular filling pattern, with      concomitant abnormal relaxation and increased filling pressure      (grade 2 diastolic dysfunction).   2. Ventricular septum: Thickness was mildly increased.   3. Aortic valve: Mildly calcified annulus. Trileaflet; mildly      calcified leaflets.   4. Left atrium: The atrium was mildly to moderately dilated.   No previous study was available for comparison.       ECG reviewed.  Exercise tolerance: good     MET: >4      (+) hypertension                                     Endo/Other      (+) diabetes  type 2,                          Pulmonary                    (+) sleep apnea       Neuro/Psych      (+) depression                                Past Surgical History:   Procedure Laterality Date    Appendectomy  10-23-12 [facility]    Biopsy of breast, incisional            Colonoscopy  19= Diverticulosis, Polyps (TA), Hemorrhoids    Repeat     Colonoscopy N/A 2022    Procedure: COLONOSCOPY;  Surgeon: Salbador Mann MD;  Location: [facility] ENDOSCOPY    Colonoscopy,biopsy  9/15/2011    Performed by SALBADOR MANN at [facility], [facility]    Colposcopy,bx cervix/endocerv curr  2017    Egd N/A 2019    Salbador Mann MD;  Grade 2 varices, Gastritis.  Gastric/SB Bx: Normal    Excisional biopsy left      left axilla     Laminectomy,lumbar      Needle biopsy left      Needle biopsy right      Other      Tummy tuck    Other surgical history      LOWER BACK SURGERY-DISC    Tonsillectomy      Us elasto liver parenchyma (hbq=[id number])  19: F-4 cirrhosis     Social History     Socioeconomic History    Marital status: Single   Tobacco Use    Smoking status: Former     Current packs/day: 0.00     Average packs/day: 0.1 packs/day for 28.0 years (2.8 ttl pk-yrs)     Types: Cigarettes     Start date: 1983     Quit date: 2011     Years since quittin.8    Smokeless tobacco: Former   Vaping Use    Vaping status: Never Used   Substance and Sexual Activity    Alcohol use: Not Currently    Drug use: No    Sexual activity: Not Currently     Partners: Male     History   Drug Use No     Available pre-op labs reviewed.  Lab Results   Component Value Date    WBC 5.3 10/31/2024    RBC 3.82 10/31/2024    HGB 11.8 (L) 10/31/2024    HCT 35.1 10/31/2024    MCV 91.9 10/31/2024    MCH 30.9 10/31/2024    MCHC 33.6 10/31/2024    RDW 14.2 10/31/2024    .0 (L) 10/31/2024     Lab Results   Component Value Date     10/31/2024    K 4.5 10/31/2024     10/31/2024    CO2 23.0 10/31/2024    BUN 9 10/31/2024    CREATSERUM 0.63 10/31/2024     (H) 10/31/2024    CA 9.7 10/31/2024     Lab Results   Component Value Date    INR 1.05 10/30/2024         Airway      Mallampati: II  Mouth opening: 3 FB  TM distance: 4 - 6 cm  Neck ROM: full Cardiovascular      Rhythm: regular  Rate: normal     Dental             Pulmonary      Breath sounds clear to auscultation bilaterally.               Other findings              ASA: 3   Plan: MAC  NPO status verified and patient meets guidelines.  Patient has taken beta blockers in last 24 hours.      Comment:     Plan/risks discussed with: patient                Present on Admission:  **None**             [1]   Medications Prior to Admission   Medication Sig Dispense Refill Last  Dose/Taking    buPROPion  MG Oral Tablet 24 Hr Take 1 tablet (150 mg total) by mouth daily.   Taking    MILK THISTLE OR Take by mouth.   Past Month    glimepiride 2 MG Oral Tab Take 1 tablet (2 mg total) by mouth daily with breakfast.   10/30/2024 Morning    zolpidem 5 MG Oral Tab Take 1 tablet (5 mg total) by mouth nightly as needed. 10 tablet 0 10/29/2024 Evening    oxyCODONE-acetaminophen 5-325 MG Oral Tab Take 1-2 tablets by mouth every 6 (six) hours as needed for Pain. 15 tablet 0 10/29/2024 Evening    lactulose 10 GM/15ML Oral Solution Take 30 mL (20 g total) by mouth 3 (three) times daily. (Patient taking differently: Take 30 mL (20 g total) by mouth 3 (three) times daily. Pt takes only once daily) 2700 mL 1 10/29/2024    SERTRALINE 100 MG Oral Tab TAKE 1 TABLET BY MOUTH EVERY DAY (Patient taking differently: Take 1.5 tablets (150 mg total) by mouth daily. TAKE 1 TABLET BY MOUTH EVERY DAY) 90 tablet 0 10/30/2024 Morning    PROPRANOLOL 10 MG Oral Tab TAKE 1 TABLET BY MOUTH THREE TIMES A  tablet 0 10/30/2024 Morning    gabapentin 300 MG Oral Cap Take 2 capsules at night   Patient needs appointment prior to next refill 180 capsule 0 10/29/2024 Evening    METFORMIN HCL 1000 MG Oral Tab TAKE 1 TABLET BY MOUTH TWICE A  tablet 0 10/30/2024 Morning    Multiple Vitamin (MULTI-VITAMIN DAILY) Oral Tab Take by mouth daily.     Past Week    Cholecalciferol (VITAMIN D) 50 MCG (2000 UT) Oral Cap Take by mouth daily.     Past Month    rosuvastatin 5 MG Oral Tab Take 1 tablet (5 mg total) by mouth nightly.   Unknown    ACCU-CHEK FASTCLIX LANCETS Does not apply Misc Once daily 100 each 3     Glucose Blood (ACCU-CHEK GIANA) In Vitro Strip Use one strip to test blood sugar once daily.  Diagnosed E11.9 100 strip 3     Accu-Chek Softclix Lancets Does not apply Misc Use to test blood glucose daily. 100 each 0

## 2024-10-31 NOTE — PLAN OF CARE
Assumed patient care at 0730. A/Ox4. Room air. Neuros Q4. Normal sinus on tele. Tylenol given for pain per MAR. Plans for MMA tomorrow, consent signed and in chart. All safety precautions are in place and will continue with plan of care.    Problem: PAIN - ADULT  Goal: Verbalizes/displays adequate comfort level or patient's stated pain goal  Description: INTERVENTIONS:  - Encourage pt to monitor pain and request assistance  - Assess pain using appropriate pain scale  - Administer analgesics based on type and severity of pain and evaluate response  - Implement non-pharmacological measures as appropriate and evaluate response  - Consider cultural and social influences on pain and pain management  - Manage/alleviate anxiety  - Utilize distraction and/or relaxation techniques  - Monitor for opioid side effects  - Notify MD/LIP if interventions unsuccessful or patient reports new pain  - Anticipate increased pain with activity and pre-medicate as appropriate  Outcome: Progressing     Problem: Diabetes/Glucose Control  Goal: Glucose maintained within prescribed range  Description: INTERVENTIONS:  - Monitor Blood Glucose as ordered  - Assess for signs and symptoms of hyperglycemia and hypoglycemia  - Administer ordered medications to maintain glucose within target range  - Assess barriers to adequate nutritional intake and initiate nutrition consult as needed  - Instruct patient on self management of diabetes  Outcome: Progressing

## 2024-10-31 NOTE — CONSULTS
[facility] Neurosurgery Consult    Ivanna Meyer Patient Status:  Observation    1955 MRN [id number]   Location [facility] 3NE-A Attending Yaw Brand, DO   Hosp Day # 0 PCP Demarcus Mcrae MD     REASON FOR CONSULTATION:  Mixed density R SDH     HISTORY OF PRESENT ILLNESS:  Ivanna Meyer is a(n) 69 year old female with PMH T2DM, HTN, HLD, hypothyroidism, hepatic encephalopathy and liver cirrhosis secondary to PRETTY, PE presenting with HA and nausea. Of note a couple months ago she was admitted for falls and contusion. Her daughter over the phone reports a month ago the patient was having more falls and worsening confusion. Over the last 1-2 weeks she had been improving with her balance and falls but was having a persistent HA for which she presented to ED for this admission.     PAST MEDICAL HISTORY:  Past Medical History:    Anxiety    situational    Back problem    Cataract    Degeneration of lumbar or lumbosacral intervertebral disc    Resolved since last addressed 10/31/19    Depression    Diabetes (HCC)    DM (diabetes mellitus), type 2 (HCC)    Hearing impairment    [place]; uses bilateral hearing aids    Hepatic encephalopathy (HCC)    High blood pressure    High cholesterol    HPV (human papilloma virus) infection    HYPOTHYROIDISM    Incontinence    Liver cirrhosis secondary to PRETTY (HCC)    Cirrhosis from the CT on [place].  Grade 2 non bleeding varices. Hx HE.  No ascites    Osteoarthritis    Other and unspecified hyperlipidemia    Pulmonary embolism (HCC)    Left lung    Shortness of breath    with activity during hot weather    Sleep apnea    not using CPAP anymore    Trochanteric bursitis    Resolved since last addressed 13    Unspecified essential hypertension    Visual impairment    glasses/contact lenses       PAST SURGICAL HISTORY:  Past Surgical History:   Procedure Laterality Date    Appendectomy  10-23-12 [facility]    Biopsy of breast, incisional             Colonoscopy  19= Diverticulosis, Polyps (TA), Hemorrhoids    Repeat     Colonoscopy N/A 2022    Procedure: COLONOSCOPY;  Surgeon: Salbador Mann MD;  Location: [facility] ENDOSCOPY    Colonoscopy,biopsy  9/15/2011    Performed by SALBADOR MANN [facility]    Colposcopy,bx cervix/endocerv curr      Egd N/A 2019    Salbador Mann MD;  Grade 2 varices, Gastritis.  Gastric/SB Bx: Normal    Excisional biopsy left      left axilla    Laminectomy,lumbar      Needle biopsy left      Needle biopsy right      Other      Tummy tuck    Other surgical history      LOWER BACK SURGERY-DISC    Tonsillectomy      Us elasto liver parenchyma (stj=[id number])  19: F-4 cirrhosis       FAMILY HISTORY:  family history includes Cancer in her father and sister; Diabetes in her mother; Heart Disease in her father and mother; Heart Disorder in her father and mother; Hypertension in her mother; hysterectomy in her sister.    SOCIAL HISTORY:   reports that she quit smoking about 13 years ago. Her smoking use included cigarettes. She started smoking about 41 years ago. She has a 2.8 pack-year smoking history. She has quit using smokeless tobacco. She reports that she does not currently use alcohol. She reports that she does not use drugs.    ALLERGIES:  Allergies[1]    MEDICATIONS:  Prescriptions Prior to Admission[2]  Current Facility-Administered Medications   Medication Dose Route Frequency    cholecalciferol (Vitamin D3) tab 2,000 Units  2,000 Units Oral Daily    lactulose (CHRONULAC) 10 GM/15ML solution 20 g  20 g Oral TID    pantoprazole (Protonix) DR tab 40 mg  40 mg Oral QAM AC    propranolol (Inderal) tab 10 mg  10 mg Oral TID    sertraline (Zoloft) tab 150 mg  150 mg Oral Daily    glucose (Dex4) 15 GM/59ML oral liquid 15 g  15 g Oral Q15 Min PRN    Or    glucose (Glutose) 40% oral gel 15 g  15 g Oral Q15 Min PRN    Or    glucose-vitamin C (Dex-4) chewable tab  4 tablet  4 tablet Oral Q15 Min PRN    Or    dextrose 50% injection 50 mL  50 mL Intravenous Q15 Min PRN    Or    glucose (Dex4) 15 GM/59ML oral liquid 30 g  30 g Oral Q15 Min PRN    Or    glucose (Glutose) 40% oral gel 30 g  30 g Oral Q15 Min PRN    Or    glucose-vitamin C (Dex-4) chewable tab 8 tablet  8 tablet Oral Q15 Min PRN    acetaminophen (Tylenol Extra Strength) tab 500 mg  500 mg Oral Q4H PRN    ondansetron (Zofran) 4 MG/2ML injection 4 mg  4 mg Intravenous Q6H PRN    metoclopramide (Reglan) 5 mg/mL injection 10 mg  10 mg Intravenous Q8H PRN    insulin aspart (NovoLOG) 100 Units/mL FlexPen 1-5 Units  1-5 Units Subcutaneous TID AC and HS    buPROPion ER (Wellbutrin XL) 24 hr tab 150 mg  150 mg Oral Daily    zolpidem (Ambien) tab 5 mg  5 mg Oral Nightly PRN    rosuvastatin (Crestor) tab 5 mg  5 mg Oral Nightly       REVIEW OF SYSTEMS:  Comprehensive Review of Systems obtained, and is negative other than that mentioned in the History of Present Illness.      PHYSICAL EXAMINATION:  VITAL SIGNS: /72 (BP Location: Left arm)   Pulse 68   Temp 98.4 °F (36.9 °C) (Oral)   Resp 18   Wt 195 lb (88.5 kg)   LMP 11/04/2009   SpO2 91%   BMI 35.67 kg/m²   GENERAL:  Patient is a 69 year old female in no acute distress.  HEENT:  Normocephalic, atraumatic    NEUROLOGICAL:  This patient is alert and orientated x 3. Mild confusion on examination  Speech fluent.   PERRLA +3 brisk,  EOMI.  VFF.  Face is symmetrical. Tongue is midline.  Uvula and palate elevate symmetrically. Shoulder shrug normal bilaterally. Remaining CN's GI.  Sensation to light touch is intact bilaterally.  Motor: Very slight left pronator drift. No arm or leg weakness noted. 5/5 bilaterally. Finger-to-nose coordination is intact.  Gait deferred.            DIAGNOSTIC DATA:   Lab Results   Component Value Date    WBC 7.4 10/30/2024    HGB 12.3 10/30/2024    HCT 37.0 10/30/2024    .0 10/30/2024    CREATSERUM 0.64 10/30/2024    BUN 10  10/30/2024     10/30/2024    K 4.2 10/30/2024     10/30/2024    CO2 27.0 10/30/2024    GLU 79 10/30/2024    CA 9.7 10/30/2024    ALB 4.3 10/30/2024    ALKPHO 129 10/30/2024    BILT 0.6 10/30/2024    TP 7.4 10/30/2024    AST 42 10/30/2024    ALT 25 10/30/2024    PTT 29.7 10/30/2024    INR 1.05 10/30/2024    PTP 13.8 10/30/2024    PGLU 140 10/31/2024       IMAGING:  CT BRAIN OR HEAD (CPT=70450)    Result Date: 10/30/2024  CONCLUSION:  Acute on chronic right subdural hematoma.  Details as above.  Critical value test result called to Dr. Dunn in the ED with accurate read back.  1730 hours.    LOCATION:  [facility]   Dictated by (CST): Fidel Aguero MD on 10/30/2024 at 5:32 PM     Finalized by (CST): Fidel Aguero MD on 10/30/2024 at 5:35 PM         ASSESSMENT:  Patient Active Problem List   Diagnosis    Cervical spondylosis    HTN (hypertension)    Anxiety    Hypothyroidism, unspecified    Sensorineural hearing loss, bilateral    Pure hypercholesterolemia    Sleep apnea    S/P lumbar laminectomy    Hepatic encephalopathy (HCC)    Liver cirrhosis secondary to PRETTY (HCC)    Portal hypertension (HCC)    Esophageal varices without bleeding (HCC)    BMI 34.0-34.9,adult    Chronic bilateral low back pain without sciatica    Restless leg syndrome    Pain in thoracic spine    Spondylolisthesis, grade 1    Type 2 diabetes mellitus with hyperglycemia, without long-term current use of insulin (HCC)    Memory loss    Single subsegmental pulmonary embolism without acute cor pulmonale (HCC)    Acute respiratory failure with hypoxia (HCC)    Acute subdural hematoma (HCC)     Ivanna Meyer is a(n) 69 year old female with PMH T2DM, HTN, HLD, hypothyroidism, hepatic encephalopathy and liver cirrhosis secondary to PRETTY, PE presenting with HA and nausea found to have mixed density 1.4cm right subdural hematoma.    Plan:  -No acute neurosurgical intervention indicated at this time  -CTA head/neck to evaluate vasculature    -Continue to hold anticoagulants/antiplatelets  -Dr. Lamar discussed at length with patient and daughter (via telephone) the benefits and risks of endovascular procedure with Right MMA embolization vs surgery with craniotomy for SDH evacuation vs observation. At this time patient with very slight left pronator drift but otherwise full strength in left hemibody. Recommend patient would benefit from Right MMA embolization. Daughter and patient in agreement to proceed.  Plan for R MMA embolization tomorrow with Dr. Lamar. Keep NPO/IVF at midnight, preop labs (CBC, BMP, coags and Type and screen)  -Further recs post-procedure     Patient seen with Dr. Brianda Pederson PA-C  [facility]  10/31/2024 7:43 AM      Is this a shared or split note between Advanced Practice Provider and Physician? Yes          [1]   Allergies  Allergen Reactions    Amoxicillin-Na Benzoate ANAPHYLAXIS    Pcn [Penicillins] HIVES    Lisinopril Coughing   [2]   Medications Prior to Admission   Medication Sig Dispense Refill Last Dose/Taking    buPROPion  MG Oral Tablet 24 Hr Take 1 tablet (150 mg total) by mouth daily.   Taking    MILK THISTLE OR Take by mouth.   Past Month    glimepiride 2 MG Oral Tab Take 1 tablet (2 mg total) by mouth daily with breakfast.   10/30/2024 Morning    zolpidem 5 MG Oral Tab Take 1 tablet (5 mg total) by mouth nightly as needed. 10 tablet 0 10/29/2024 Evening    oxyCODONE-acetaminophen 5-325 MG Oral Tab Take 1-2 tablets by mouth every 6 (six) hours as needed for Pain. 15 tablet 0 10/29/2024 Evening    lactulose 10 GM/15ML Oral Solution Take 30 mL (20 g total) by mouth 3 (three) times daily. (Patient taking differently: Take 30 mL (20 g total) by mouth 3 (three) times daily. Pt takes only once daily) 2700 mL 1 10/29/2024    SERTRALINE 100 MG Oral Tab TAKE 1 TABLET BY MOUTH EVERY DAY (Patient taking differently: Take 1.5 tablets (150 mg total) by mouth daily. TAKE 1  TABLET BY MOUTH EVERY DAY) 90 tablet 0 10/30/2024 Morning    PROPRANOLOL 10 MG Oral Tab TAKE 1 TABLET BY MOUTH THREE TIMES A  tablet 0 10/30/2024 Morning    gabapentin 300 MG Oral Cap Take 2 capsules at night   Patient needs appointment prior to next refill 180 capsule 0 10/29/2024 Evening    METFORMIN HCL 1000 MG Oral Tab TAKE 1 TABLET BY MOUTH TWICE A  tablet 0 10/30/2024 Morning    Multiple Vitamin (MULTI-VITAMIN DAILY) Oral Tab Take by mouth daily.     Past Week    Cholecalciferol (VITAMIN D) 50 MCG (2000 UT) Oral Cap Take by mouth daily.     Past Month    rosuvastatin 5 MG Oral Tab Take 1 tablet (5 mg total) by mouth nightly.   Unknown    ACCU-CHEK FASTCLIX LANCETS Does not apply Misc Once daily 100 each 3     Glucose Blood (ACCU-CHEK GIANA) In Vitro Strip Use one strip to test blood sugar once daily.  Diagnosed E11.9 100 strip 3     Accu-Chek Softclix Lancets Does not apply Misc Use to test blood glucose daily. 100 each 0

## 2024-11-01 ENCOUNTER — ANESTHESIA (OUTPATIENT)
Dept: INTERVENTIONAL RADIOLOGY/VASCULAR | Facility: HOSPITAL | Age: 69
End: 2024-11-01
Payer: MEDICARE

## 2024-11-01 ENCOUNTER — APPOINTMENT (OUTPATIENT)
Dept: INTERVENTIONAL RADIOLOGY/VASCULAR | Facility: HOSPITAL | Age: 69
End: 2024-11-01
Attending: NEUROLOGICAL SURGERY
Payer: MEDICARE

## 2024-11-01 PROBLEM — K74.60 HEPATIC CIRRHOSIS (HCC): Status: ACTIVE | Noted: 2019-11-21

## 2024-11-01 PROBLEM — E78.5 HYPERLIPIDEMIA: Status: ACTIVE | Noted: 2024-11-01

## 2024-11-01 LAB
ANION GAP SERPL CALC-SCNC: 6 MMOL/L (ref 0–18)
ANTIBODY SCREEN: NEGATIVE
APTT PPP: 31.2 SECONDS (ref 23–36)
BASOPHILS # BLD AUTO: 0.03 X10(3) UL (ref 0–0.2)
BASOPHILS NFR BLD AUTO: 0.6 %
BUN BLD-MCNC: 12 MG/DL (ref 9–23)
CALCIUM BLD-MCNC: 9.6 MG/DL (ref 8.7–10.4)
CHLORIDE SERPL-SCNC: 107 MMOL/L (ref 98–112)
CO2 SERPL-SCNC: 28 MMOL/L (ref 21–32)
CREAT BLD-MCNC: 0.64 MG/DL
EGFRCR SERPLBLD CKD-EPI 2021: 94 ML/MIN/1.73M2 (ref 60–?)
EOSINOPHIL # BLD AUTO: 0 X10(3) UL (ref 0–0.7)
EOSINOPHIL NFR BLD AUTO: 0 %
ERYTHROCYTE [DISTWIDTH] IN BLOOD BY AUTOMATED COUNT: 14.3 %
GLUCOSE BLD-MCNC: 130 MG/DL (ref 70–99)
GLUCOSE BLD-MCNC: 142 MG/DL (ref 70–99)
GLUCOSE BLD-MCNC: 143 MG/DL (ref 70–99)
GLUCOSE BLD-MCNC: 165 MG/DL (ref 70–99)
GLUCOSE BLD-MCNC: 176 MG/DL (ref 70–99)
GLUCOSE BLD-MCNC: 216 MG/DL (ref 70–99)
HCT VFR BLD AUTO: 36.8 %
HGB BLD-MCNC: 11.9 G/DL
IMM GRANULOCYTES # BLD AUTO: 0.02 X10(3) UL (ref 0–1)
IMM GRANULOCYTES NFR BLD: 0.4 %
INR BLD: 1.13 (ref 0.8–1.2)
LYMPHOCYTES # BLD AUTO: 0.93 X10(3) UL (ref 1–4)
LYMPHOCYTES NFR BLD AUTO: 18.7 %
MAGNESIUM SERPL-MCNC: 1.8 MG/DL (ref 1.6–2.6)
MCH RBC QN AUTO: 30.1 PG (ref 26–34)
MCHC RBC AUTO-ENTMCNC: 32.3 G/DL (ref 31–37)
MCV RBC AUTO: 92.9 FL
MONOCYTES # BLD AUTO: 0.48 X10(3) UL (ref 0.1–1)
MONOCYTES NFR BLD AUTO: 9.6 %
NEUTROPHILS # BLD AUTO: 3.52 X10 (3) UL (ref 1.5–7.7)
NEUTROPHILS # BLD AUTO: 3.52 X10(3) UL (ref 1.5–7.7)
NEUTROPHILS NFR BLD AUTO: 70.7 %
OSMOLALITY SERPL CALC.SUM OF ELEC: 294 MOSM/KG (ref 275–295)
PLATELET # BLD AUTO: 109 10(3)UL (ref 150–450)
POTASSIUM SERPL-SCNC: 3.8 MMOL/L (ref 3.5–5.1)
PROTHROMBIN TIME: 14.7 SECONDS (ref 11.6–14.8)
RBC # BLD AUTO: 3.96 X10(6)UL
RH BLOOD TYPE: POSITIVE
RH BLOOD TYPE: POSITIVE
SODIUM SERPL-SCNC: 141 MMOL/L (ref 136–145)
WBC # BLD AUTO: 5 X10(3) UL (ref 4–11)

## 2024-11-01 PROCEDURE — 75898 FOLLOW-UP ANGIOGRAPHY: CPT | Performed by: NEUROLOGICAL SURGERY

## 2024-11-01 PROCEDURE — B313YZZ FLUOROSCOPY OF RIGHT COMMON CAROTID ARTERY USING OTHER CONTRAST: ICD-10-PCS | Performed by: NEUROLOGICAL SURGERY

## 2024-11-01 PROCEDURE — 03LG3DZ OCCLUSION OF INTRACRANIAL ARTERY WITH INTRALUMINAL DEVICE, PERCUTANEOUS APPROACH: ICD-10-PCS | Performed by: NEUROLOGICAL SURGERY

## 2024-11-01 PROCEDURE — B31RYZZ FLUOROSCOPY OF INTRACRANIAL ARTERIES USING OTHER CONTRAST: ICD-10-PCS | Performed by: NEUROLOGICAL SURGERY

## 2024-11-01 PROCEDURE — 61624 TCAT PERM OCCLS/EMBOLJ CNS: CPT | Performed by: NEUROLOGICAL SURGERY

## 2024-11-01 PROCEDURE — 99233 SBSQ HOSP IP/OBS HIGH 50: CPT | Performed by: NEUROLOGICAL SURGERY

## 2024-11-01 PROCEDURE — 75710 ARTERY X-RAYS ARM/LEG: CPT | Performed by: NEUROLOGICAL SURGERY

## 2024-11-01 PROCEDURE — 36224 PLACE CATH CAROTD ART: CPT | Performed by: NEUROLOGICAL SURGERY

## 2024-11-01 PROCEDURE — 36227 PLACE CATH XTRNL CAROTID: CPT | Performed by: NEUROLOGICAL SURGERY

## 2024-11-01 PROCEDURE — B316YZZ FLUOROSCOPY OF RIGHT INTERNAL CAROTID ARTERY USING OTHER CONTRAST: ICD-10-PCS | Performed by: NEUROLOGICAL SURGERY

## 2024-11-01 PROCEDURE — 99291 CRITICAL CARE FIRST HOUR: CPT | Performed by: OTHER

## 2024-11-01 PROCEDURE — 76937 US GUIDE VASCULAR ACCESS: CPT | Performed by: NEUROLOGICAL SURGERY

## 2024-11-01 PROCEDURE — B319YZZ FLUOROSCOPY OF RIGHT EXTERNAL CAROTID ARTERY USING OTHER CONTRAST: ICD-10-PCS | Performed by: NEUROLOGICAL SURGERY

## 2024-11-01 RX ORDER — MORPHINE SULFATE 2 MG/ML
1 INJECTION, SOLUTION INTRAMUSCULAR; INTRAVENOUS EVERY 2 HOUR PRN
Status: DISCONTINUED | OUTPATIENT
Start: 2024-11-01 | End: 2024-11-02

## 2024-11-01 RX ORDER — METOCLOPRAMIDE HYDROCHLORIDE 5 MG/ML
10 INJECTION INTRAMUSCULAR; INTRAVENOUS EVERY 8 HOURS PRN
Status: DISCONTINUED | OUTPATIENT
Start: 2024-11-01 | End: 2024-11-02

## 2024-11-01 RX ORDER — NALOXONE HYDROCHLORIDE 0.4 MG/ML
0.08 INJECTION, SOLUTION INTRAMUSCULAR; INTRAVENOUS; SUBCUTANEOUS AS NEEDED
Status: ACTIVE | OUTPATIENT
Start: 2024-11-01 | End: 2024-11-01

## 2024-11-01 RX ORDER — LABETALOL HYDROCHLORIDE 5 MG/ML
10 INJECTION, SOLUTION INTRAVENOUS EVERY 10 MIN PRN
Status: COMPLETED | OUTPATIENT
Start: 2024-11-01 | End: 2024-11-01

## 2024-11-01 RX ORDER — LIDOCAINE HYDROCHLORIDE AND EPINEPHRINE BITARTRATE 20; .01 MG/ML; MG/ML
INJECTION, SOLUTION SUBCUTANEOUS
Status: DISCONTINUED
Start: 2024-11-01 | End: 2024-11-01 | Stop reason: WASHOUT

## 2024-11-01 RX ORDER — ONDANSETRON 2 MG/ML
4 INJECTION INTRAMUSCULAR; INTRAVENOUS EVERY 6 HOURS PRN
Status: DISCONTINUED | OUTPATIENT
Start: 2024-11-01 | End: 2024-11-02

## 2024-11-01 RX ORDER — HYDROMORPHONE HYDROCHLORIDE 1 MG/ML
0.4 INJECTION, SOLUTION INTRAMUSCULAR; INTRAVENOUS; SUBCUTANEOUS EVERY 5 MIN PRN
Status: ACTIVE | OUTPATIENT
Start: 2024-11-01 | End: 2024-11-01

## 2024-11-01 RX ORDER — ACETAMINOPHEN 650 MG/1
650 SUPPOSITORY RECTAL EVERY 4 HOURS PRN
Status: DISCONTINUED | OUTPATIENT
Start: 2024-11-01 | End: 2024-11-02

## 2024-11-01 RX ORDER — HYDROMORPHONE HYDROCHLORIDE 1 MG/ML
0.6 INJECTION, SOLUTION INTRAMUSCULAR; INTRAVENOUS; SUBCUTANEOUS EVERY 5 MIN PRN
Status: ACTIVE | OUTPATIENT
Start: 2024-11-01 | End: 2024-11-01

## 2024-11-01 RX ORDER — SODIUM CHLORIDE, SODIUM LACTATE, POTASSIUM CHLORIDE, CALCIUM CHLORIDE 600; 310; 30; 20 MG/100ML; MG/100ML; MG/100ML; MG/100ML
INJECTION, SOLUTION INTRAVENOUS CONTINUOUS
Status: DISCONTINUED | OUTPATIENT
Start: 2024-11-01 | End: 2024-11-01

## 2024-11-01 RX ORDER — HEPARIN SODIUM 1000 [USP'U]/ML
INJECTION, SOLUTION INTRAVENOUS; SUBCUTANEOUS
Status: COMPLETED
Start: 2024-11-01 | End: 2024-11-01

## 2024-11-01 RX ORDER — MIDAZOLAM HYDROCHLORIDE 1 MG/ML
INJECTION INTRAMUSCULAR; INTRAVENOUS AS NEEDED
Status: DISCONTINUED | OUTPATIENT
Start: 2024-11-01 | End: 2024-11-01 | Stop reason: SURG

## 2024-11-01 RX ORDER — ONDANSETRON 2 MG/ML
4 INJECTION INTRAMUSCULAR; INTRAVENOUS EVERY 6 HOURS PRN
Status: DISCONTINUED | OUTPATIENT
Start: 2024-11-01 | End: 2024-11-01

## 2024-11-01 RX ORDER — IODIXANOL 320 MG/ML
200 INJECTION, SOLUTION INTRAVASCULAR
Status: COMPLETED | OUTPATIENT
Start: 2024-11-01 | End: 2024-11-01

## 2024-11-01 RX ORDER — ACETAMINOPHEN 325 MG/1
650 TABLET ORAL EVERY 4 HOURS PRN
Status: DISCONTINUED | OUTPATIENT
Start: 2024-11-01 | End: 2024-11-02

## 2024-11-01 RX ORDER — VERAPAMIL HYDROCHLORIDE 2.5 MG/ML
INJECTION, SOLUTION INTRAVENOUS
Status: DISCONTINUED
Start: 2024-11-01 | End: 2024-11-01 | Stop reason: WASHOUT

## 2024-11-01 RX ORDER — HYDROMORPHONE HYDROCHLORIDE 1 MG/ML
0.2 INJECTION, SOLUTION INTRAMUSCULAR; INTRAVENOUS; SUBCUTANEOUS EVERY 5 MIN PRN
Status: ACTIVE | OUTPATIENT
Start: 2024-11-01 | End: 2024-11-01

## 2024-11-01 RX ORDER — GABAPENTIN 300 MG/1
300 CAPSULE ORAL 3 TIMES DAILY
Status: DISCONTINUED | OUTPATIENT
Start: 2024-11-01 | End: 2024-11-02

## 2024-11-01 RX ORDER — LIDOCAINE HYDROCHLORIDE 10 MG/ML
INJECTION, SOLUTION INFILTRATION; PERINEURAL
Status: COMPLETED
Start: 2024-11-01 | End: 2024-11-01

## 2024-11-01 RX ORDER — GABAPENTIN 300 MG/1
300 CAPSULE ORAL 3 TIMES DAILY
COMMUNITY

## 2024-11-01 RX ORDER — SODIUM CHLORIDE 9 MG/ML
INJECTION, SOLUTION INTRAVENOUS CONTINUOUS
Status: ACTIVE | OUTPATIENT
Start: 2024-11-01 | End: 2024-11-01

## 2024-11-01 RX ORDER — METOCLOPRAMIDE HYDROCHLORIDE 5 MG/ML
10 INJECTION INTRAMUSCULAR; INTRAVENOUS EVERY 8 HOURS PRN
Status: DISCONTINUED | OUTPATIENT
Start: 2024-11-01 | End: 2024-11-01

## 2024-11-01 RX ORDER — MORPHINE SULFATE 2 MG/ML
2 INJECTION, SOLUTION INTRAMUSCULAR; INTRAVENOUS EVERY 2 HOUR PRN
Status: DISCONTINUED | OUTPATIENT
Start: 2024-11-01 | End: 2024-11-02

## 2024-11-01 RX ORDER — SODIUM CHLORIDE 9 MG/ML
INJECTION, SOLUTION INTRAVENOUS CONTINUOUS PRN
Status: DISCONTINUED | OUTPATIENT
Start: 2024-11-01 | End: 2024-11-01 | Stop reason: SURG

## 2024-11-01 RX ADMIN — MIDAZOLAM HYDROCHLORIDE 1 MG: 1 INJECTION INTRAMUSCULAR; INTRAVENOUS at 07:52:00

## 2024-11-01 RX ADMIN — MIDAZOLAM HYDROCHLORIDE 0.5 MG: 1 INJECTION INTRAMUSCULAR; INTRAVENOUS at 08:43:00

## 2024-11-01 RX ADMIN — SODIUM CHLORIDE: 9 INJECTION, SOLUTION INTRAVENOUS at 09:52:00

## 2024-11-01 RX ADMIN — MIDAZOLAM HYDROCHLORIDE 0.5 MG: 1 INJECTION INTRAMUSCULAR; INTRAVENOUS at 08:19:00

## 2024-11-01 RX ADMIN — SODIUM CHLORIDE: 9 INJECTION, SOLUTION INTRAVENOUS at 07:39:00

## 2024-11-01 RX ADMIN — MIDAZOLAM HYDROCHLORIDE 0.5 MG: 1 INJECTION INTRAMUSCULAR; INTRAVENOUS at 09:26:00

## 2024-11-01 RX ADMIN — MIDAZOLAM HYDROCHLORIDE 1 MG: 1 INJECTION INTRAMUSCULAR; INTRAVENOUS at 07:46:00

## 2024-11-01 NOTE — ANESTHESIA POSTPROCEDURE EVALUATION
[facility]    Ivanna Meyer Patient Status:  Observation   Age/Gender 69 year old female MRN [id number]   Location [facility] 6NE-A Attending Yaw Brand,    Hosp Day # 1 PCP Demarcus Mcrae MD       Anesthesia Post-op Note        Procedure Summary       Date: 11/01/24 Room / Location: [facility] Interventional [facility]es    Anesthesia Start: 0739 Anesthesia Stop:     Procedure: IVS NEURO Diagnosis: (Subdural hematoma)    Scheduled Providers: Jeremy Crespo MD Anesthesiologist: Jeremy Crespo MD    Anesthesia Type: MAC ASA Status: 3            Anesthesia Type: MAC    Vitals Value Taken Time   /75 11/01/24 1011   Temp 98 °F (36.7 °C) 11/01/24 1011   Pulse 61 11/01/24 1011   Resp 16 11/01/24 1011   SpO2 100 % 11/01/24 1011       Patient Location: ICU    Anesthesia Type: MAC    Airway Patency: patent    Postop Pain Control: adequate    Mental Status: preanesthetic baseline    Nausea/Vomiting: none    Cardiopulmonary/Hydration status: stable euvolemic    Complications: no apparent anesthesia related complications    Postop vital signs: stable    Dental Exam: Unchanged from Preop    Sign out given to ICU staff.

## 2024-11-01 NOTE — ANESTHESIA PROCEDURE NOTES
Peripheral IV  Date/Time: 11/1/2024 8:00 AM  Inserted by: Jeremy Crespo MD (robert)    Placement  Needle size: 22 G  Laterality: right  Location: forearm  Site prep: alcohol  Technique: anatomical landmarks

## 2024-11-01 NOTE — CONSULTS
[facility]   NEUROCRITICAL CARE   CONSULT NOTE    Admission date: 10/30/2024  Reason for Consult: S/p MMA embolization  Chief Complaint:   Chief Complaint   Patient presents with    Nausea/vomiting    Headache   ________________________________________________________________    History     History of Presenting Illness  69 year old female with PMH of anxiety, cirrhosis, HTN, HLD, and DM who presents with acute on chronic right subdural hemorrhage now status post MMA embolization.  Patient presented to the hospital on 10/30 with headache and nausea.  CT demonstrated the above.  Monitored on the floor with stable exam, here now status post uncomplicated angio procedure as noted above. No new complaints post angio. R facial pain improved from day before.     Past Medical History:    Anxiety    situational    Back problem    Cataract    Degeneration of lumbar or lumbosacral intervertebral disc    Resolved since last addressed 10/31/19    Depression    Diabetes (HCC)    DM (diabetes mellitus), type 2 (HCC)    Hearing impairment    [place]; uses bilateral hearing aids    Hepatic encephalopathy (HCC)    High blood pressure    High cholesterol    HPV (human papilloma virus) infection    HYPOTHYROIDISM    Incontinence    Liver cirrhosis secondary to PRETTY (HCC)    Cirrhosis from the CT on [place].  Grade 2 non bleeding varices. Hx HE.  No ascites    Osteoarthritis    Other and unspecified hyperlipidemia    Pulmonary embolism (HCC)    Left lung    Shortness of breath    with activity during hot weather    Sleep apnea    not using CPAP anymore    Trochanteric bursitis    Resolved since last addressed 13    Unspecified essential hypertension    Visual impairment    glasses/contact lenses     Past Surgical History:   Procedure Laterality Date    Appendectomy  10-23-12 [facility]    Biopsy of breast, incisional            Colonoscopy  19= Diverticulosis, Polyps (TA), Hemorrhoids    Repeat      Colonoscopy N/A 2022    Procedure: COLONOSCOPY;  Surgeon: Salbador Mann MD;  Location: [facility] ENDOSCOPY    Colonoscopy,biopsy  9/15/2011    Performed by SALBADOR MANN at [facility] [place], [facility]    Colposcopy,bx cervix/endocerv curr  2017    Egd N/A 2019    Salbador Mann MD;  Grade 2 varices, Gastritis.  Gastric/SB Bx: Normal    Excisional biopsy left      left axilla    Laminectomy,lumbar      Needle biopsy left      Needle biopsy right      Other      Tummy tuck    Other surgical history      LOWER BACK SURGERY-DISC    Tonsillectomy      Us elasto liver parenchyma (ehm=[id number])  19: F-4 cirrhosis     Social History     Socioeconomic History    Marital status: Single   Tobacco Use    Smoking status: Former     Current packs/day: 0.00     Average packs/day: 0.1 packs/day for 28.0 years (2.8 ttl pk-yrs)     Types: Cigarettes     Start date: 1983     Quit date: 2011     Years since quittin.8    Smokeless tobacco: Former   Vaping Use    Vaping status: Never Used   Substance and Sexual Activity    Alcohol use: Not Currently    Drug use: No    Sexual activity: Not Currently     Partners: Male     Social Drivers of Health     Food Insecurity: No Food Insecurity (10/30/2024)    Food Insecurity     Food Insecurity: Never true   Transportation Needs: No Transportation Needs (10/30/2024)    Transportation Needs     Lack of Transportation: No   Housing Stability: Low Risk  (10/30/2024)    Housing Stability     Housing Instability: No     Family History   Problem Relation Age of Onset    Heart Disease Father     Cancer Father         prostate cancer    Heart Disorder Father     Heart Disease Mother     Diabetes Mother     Hypertension Mother     Heart Disorder Mother         enlarged heart, pacemaker    Cancer Sister         uterine cancer    Other (hysterectomy) Sister      Allergies Allergies[1]    Home Meds  Current Outpatient Medications   Medication Instructions     ACCU-CHEK FASTCLIX LANCETS Does not apply Misc Once daily    Accu-Chek Softclix Lancets Does not apply Misc Use to test blood glucose daily.    buPROPion ER (WELLBUTRIN XL) 150 mg, Daily    Cholecalciferol (VITAMIN D) 50 MCG (2000 UT) Oral Cap Daily    gabapentin 300 MG Oral Cap Take 2 capsules at night   Patient needs appointment prior to next refill    glimepiride (AMARYL) 2 mg, Daily with breakfast    Glucose Blood (ACCU-CHEK GIANA) In Vitro Strip Use one strip to test blood sugar once daily.  Diagnosed E11.9    lactulose (CHRONULAC) 20 g, Oral, 3 times daily    METFORMIN HCL 1000 MG Oral Tab TAKE 1 TABLET BY MOUTH TWICE A DAY    MILK THISTLE OR Take by mouth.    Multiple Vitamin (MULTI-VITAMIN DAILY) Oral Tab Daily    oxyCODONE-acetaminophen 5-325 MG Oral Tab 1-2 tablets, Oral, Every 6 hours PRN    PROPRANOLOL 10 MG Oral Tab TAKE 1 TABLET BY MOUTH THREE TIMES A DAY    rosuvastatin (CRESTOR) 5 mg, Oral, Nightly    SERTRALINE 100 MG Oral Tab TAKE 1 TABLET BY MOUTH EVERY DAY    zolpidem (AMBIEN) 5 mg, Oral, Nightly PRN     Scheduled Meds:   cholecalciferol  2,000 Units Oral Daily    lactulose  20 g Oral TID    pantoprazole  40 mg Oral QAM AC    propranolol  10 mg Oral TID    sertraline  150 mg Oral Daily    insulin aspart  1-5 Units Subcutaneous TID AC and HS    buPROPion ER  150 mg Oral Daily    rosuvastatin  5 mg Oral Nightly     Continuous Infusions:  PRN Meds:  oxyCODONE-acetaminophen **OR** oxyCODONE-acetaminophen    glucose **OR** glucose **OR** glucose-vitamin C **OR** dextrose **OR** glucose **OR** glucose **OR** glucose-vitamin C    acetaminophen    ondansetron    metoclopramide    zolpidem    OBJECTIVE   VITAL SIGNS:   Temp:  [98 °F (36.7 °C)-98.6 °F (37 °C)] 98.6 °F (37 °C)  Pulse:  [57-65] 57  Resp:  [18-19] 18  BP: (109-137)/(54-78) 136/64  SpO2:  [90 %-94 %] 90 %    INTAKE/OUTPUT:  No intake or output data in the 24 hours ending 11/01/24 4056    PHYSICAL EXAM:  GENERAL APPEARANCE: Patient resting  comfortably in bed, NAD  HEENT: Normocephalic, atraumatic.   LUNGS: Normal respiratory rate and effort   HEART: Regular rate and rhythm   ABDOMEN: Soft. No tenderness, guarding, or rebound.     NEUROLOGIC:    Mental Status:  A&O x 4, Follows simple commands, no obvious aphasia or dysarthria  Cranial nerves: PERRL.  Visual fields full.  EOMI.  Face symmetric with normal movement bilaterally.  Hearing grossly intact. Tongue midline with normal movements.   Motor: Drift:  BL UE drift mild; Motor exam is 5 out of 5 in all extremities bilaterally, limited testing in RLE 2/2 post angio precautions.   Sensation: Intact to light touch bilaterally  Cerebellar: Normal Finger-To-Nose test    LABORATORY DATA:  Last 24 hour labs were reviewed in detail.  Recent Labs   Lab 10/30/24  1600 10/31/24  0721 11/01/24  0425    137 141   K 4.2 4.5 3.8    107 107   CO2 27.0 23.0 28.0   GLU 79 136* 142*   BUN 10 9 12   CREATSERUM 0.64 0.63 0.64     Recent Labs   Lab 10/30/24  1600 10/31/24  1026 11/01/24  0425   WBC 7.4 5.3 5.0   HGB 12.3 11.8* 11.9*   .0 119.0* 109.0*     Recent Labs   Lab 10/30/24  1600 10/31/24  0721   ALT 25 21   AST 42* 44*     Recent Labs   Lab 10/31/24  0721 11/01/24  0425   MG 1.8 1.8       Radiology:    CTA BRAIN + CTA CAROTIDS (CPT=70496/45358)    Result Date: 10/31/2024  CONCLUSION:   1. Stable size mixed attenuation subdural hemorrhage right hemisphere.  2. No signs of arterial occlusion or high-grade stenosis.  3. 33% stenosis left carotid bulb from primarily noncalcified plaque.  4. External carotid arteries are patent.  5. Note made that the right common carotid artery swings toward the midline, tortuous appearing.  6. Mild irregularity of the distal left vertebral artery could reflect changes of fibromuscular dysplasia.  No aneurysm or dissection.    LOCATION:  [facility]   Dictated by (CST): Moreno Diaz MD on 10/31/2024 at 1:11 PM     Finalized by (CST): Moreno Diaz MD on 10/31/2024  at 1:18 PM      ASSESSMENT/PLAN   69 year old female with PMH of anxiety, cirrhosis, HTN, HLD, and DM who presents with acute on chronic right subdural hemorrhage now status post MMA embolization.    Neurological:  Acute on subacute Right Subdural Hematoma   - Initial CT with above   - Nsg following, appreciate recs    - Now s/p R MMA embolization   - PT/OT evals in AM    Anxiety - Continue home medications    Cardiovascular:  HTN   - SBP goal < 150   - Resume home medications   - IV labetalol/hydralazine pushes and Nicardipine gtt prn     HLD - Continue home med    Pulmonary:    - Satting well on RA, encourage IS     Renal:         - Monitor I&Os          - IVF while NPO    Gastrointestinal:  Nutrition:        - ADAT        - Bowel regimen: ordered prn    Cirrhosis  History of hepatic encephalopathy - Continue home propranolol, lactulose, PPI    Infectious Disease: - No leukocytosis, afebrile, continue to monitor     Heme/Onc   Anemia - Hb goal > 7, continue to monitor daily     Thrombocytopenia - Goal greater than 100 K, continue to monitor    Endocrine:  DM Type II, uncontrolled    - Hold PO Meds in the hospital   - HbA1c 6.6   - Accuchecks q6 with SSI prn     Checklist   - Lines: PIVs   - DVT Prophylaxis: SCDs    - Diet: ADAT   - IVF: Dc    - Electrolytes: Replete per protocol   - Code Status: FULL    Dispo: CNICU    Greater than 35 minutes of critical care time (exclusive of billable procedures) was administered to manage and/or prevent neurologic instability. This involved direct patient intervention, complex decision making, and/or extensive discussions with the patient, family, and clinical staff.    Brad Lawson MD  Neurocritical Care  [facility]    Disclaimer: This record was dictated using Dragon software. There may be errors due to voice recognition problems that were not realized and corrected during the completion of the note.           [1]   Allergies  Allergen Reactions     Amoxicillin-Na Benzoate ANAPHYLAXIS    Pcn [Penicillins] HIVES    Lisinopril Coughing

## 2024-11-01 NOTE — PAYOR COMM NOTE
--------------  ADMISSION REVIEW     Payor: KATHERINE OTOOLE [facility] [place]  Subscriber #:  [id number]  Authorization Number: [id number]    Admit date: 10/31/24  Admit time:  2:41 PM      Changed to IP      10/31/24 1441  Admit to inpatient Once  Once     Completed     Service: Medical  Level of Care: Acute   Question: Diagnosis Answer: Acute subdural hematoma (HCC)    10/31/24 1441   10/30/24 1847  Place in observation Once  (Place Observation TELE Medicine)  Once     Completed     Service: Medical  Level of Care: Telemetry  Patient Class: Observation   Question: Diagnosis Answer: Acute subdural hematoma (HCC)    10/30/24 1846            REVIEW DOCUMENTATION:    Patient Seen in: [facility] Emergency Department    History     Chief Complaint   Patient presents with    Nausea/vomiting    Headache     Stated Complaint: \"falling a lot\" nausea/ vomiting, headaches    Subjective:   HPI  69-year-old female has a history of having cirrhosis as well as problems her balance with does fall a lot says that she has been having trouble with headaches and some nausea over the last month.  On 19 October she had spoken with her physician about these and had a CT brain done today.  She went to see her therapist and had a review of her testing and it was noted that she has a subacute subdural hematoma with mass effect noted on her CT was sent to the emergency room.  This but no other new falls.  She is not having a headache at this particular time nor nausea but it does come and go and she does get dizzy spells that come and go.  The patient is having no other new complaint at this particular time.    Objective:     Past Medical History:    Anxiety    situational    Back problem    Cataract    Degeneration of lumbar or lumbosacral intervertebral disc    Resolved since last addressed 10/31/19    Depression    Diabetes (HCC)    DM (diabetes mellitus), type 2 (HCC)    Hearing impairment    [place]; uses bilateral hearing aids    Hepatic encephalopathy  (HCC)    High blood pressure    High cholesterol    HPV (human papilloma virus) infection    HYPOTHYROIDISM    Incontinence    Liver cirrhosis secondary to PRETTY (HCC)    Cirrhosis from the CT on US [place].  Grade 2 non bleeding varices. Hx HE.  No ascites    Osteoarthritis    Other and unspecified hyperlipidemia    Pulmonary embolism (HCC)    Left lung    Shortness of breath    with activity during hot weather    Sleep apnea    not using CPAP anymore    Trochanteric bursitis    Resolved since last addressed 13    Unspecified essential hypertension    Visual impairment    glasses/contact lenses     Past Surgical History:   Procedure Laterality Date    Appendectomy  10-23-12 [facility]    Biopsy of breast, incisional            Colonoscopy  19= Diverticulosis, Polyps (TA), Hemorrhoids    Repeat     Colonoscopy N/A 2022    Procedure: COLONOSCOPY;  Surgeon: Salbador Mann MD;  Location: [facility] ENDOSCOPY    Colonoscopy,biopsy  9/15/2011    Performed by SALBAD[facility] MANN [facility] [place]    Colposcopy,bx cervix/endocerv curr      Egd N/A 2019    Salbador Mann MD;  Grade 2 varices, Gastritis.  Gastric/SB Bx: Normal    Excisional biopsy left      left axilla    Laminectomy,lumbar      Needle biopsy left  1988    Needle biopsy right      Other      Tummy tuck    Other surgical history      LOWER BACK SURGERY-DISC    Tonsillectomy      Us elasto liver parenchyma (wmn=87736/25978)  19: F-4 cirrhosis     Current Vitals:   Vital Signs  BP: 142/83  Pulse: 64  Resp: 16  Temp: 98 °F (36.7 °C)  Temp src: Temporal  MAP (mmHg): (!) 103       CT BRAIN OR HEAD (CPT=70450)    Result Date: 10/30/2024  PROCEDURE:  CT BRAIN OR HEAD (30377)  COMPARISON:  [facility] , CT, CT BRAIN OR HEAD (30572), 10/27/2020, 4:55 PM.  INDICATIONS:  falling a lot nausea/ vomiting, headaches  TECHNIQUE:  Noncontrast CT scanning is performed through the brain. Dose reduction techniques were used. Dose  information is transmitted to the ACR (American College of Radiology) NRDR (National Radiology Data Registry) which includes the Dose Index Registry.  PATIENT STATED HISTORY: (As transcribed by Technologist)  falling a lot nausea/ vomiting, headaches    FINDINGS:  Overall ventricles and sulci are normal caliber allowing for age.  There is a new extra-axial fluid collection.  The majority is low attenuation.  There are some small areas of high attenuation blood.  Maximal thickness is measured 1.4 cm.  There is flattening of the subjacent cerebrum.  0.2 cm right to left midline shift.  No intraparenchymal or intraventricular blood.  Maintained gray-white matter differentiation.  The visualized paranasal sinuses and mastoid air cells are satisfactorily aerated.               CONCLUSION:  Acute on chronic right subdural hematoma.  Details as above.  Critical value test result called to Dr. Dunn in the ED with accurate read back.  1730 hours.    LOCATION:  [facility]   Dictated by (CST): Fidel Aguero MD on 10/30/2024 at 5:32 PM     Finalized by (CST): Fidel Aguero MD on 10/30/2024 at 5:35 PM          Medical Decision Making      Disposition and Plan     Clinical Impression:  1. Acute subdural hematoma (HCC)       10/31/24 Neurosurgery     REASON FOR CONSULTATION:  Mixed density R SDH      HISTORY OF PRESENT ILLNESS:  Ivanna Meyer is a(n) 69 year old female with PMH T2DM, HTN, HLD, hypothyroidism, hepatic encephalopathy and liver cirrhosis secondary to PRETTY, PE presenting with HA and nausea. Of note a couple months ago she was admitted for falls and contusion. Her daughter over the phone reports a month ago the patient was having more falls and worsening confusion. Over the last 1-2 weeks she had been improving with her balance and falls but was having a persistent HA for which she presented to ED for this admission.      MAGING:  CT BRAIN OR HEAD (CPT=70450)     Result Date: 10/30/2024  CONCLUSION:  Acute on  chronic right subdural hematoma.  Details as above.  Critical value test result called to Dr. Dunn in the ED with accurate read back.  1730 hours.    LOCATION:  [facility]   Dictated by (CST): Fidel Aguero MD on 10/30/2024 at 5:32 PM     Finalized by (CST): Fidel Aguero MD on 10/30/2024 at 5:35 PM          ASSESSMENT:      Patient Active Problem List   Diagnosis    Cervical spondylosis    HTN (hypertension)    Anxiety    Hypothyroidism, unspecified    Sensorineural hearing loss, bilateral    Pure hypercholesterolemia    Sleep apnea    S/P lumbar laminectomy    Hepatic encephalopathy (HCC)    Liver cirrhosis secondary to PRETTY (HCC)    Portal hypertension (HCC)    Esophageal varices without bleeding (HCC)    BMI 34.0-34.9,adult    Chronic bilateral low back pain without sciatica    Restless leg syndrome    Pain in thoracic spine    Spondylolisthesis, grade 1    Type 2 diabetes mellitus with hyperglycemia, without long-term current use of insulin (HCC)    Memory loss    Single subsegmental pulmonary embolism without acute cor pulmonale (HCC)    Acute respiratory failure with hypoxia (HCC)    Acute subdural hematoma (HCC)      Ivanna Meyer is a(n) 69 year old female with PMH T2DM, HTN, HLD, hypothyroidism, hepatic encephalopathy and liver cirrhosis secondary to PRETTY, PE presenting with HA and nausea found to have mixed density 1.4cm right subdural hematoma.     Plan:  -No acute neurosurgical intervention indicated at this time  -CTA head/neck to evaluate vasculature   -Continue to hold anticoagulants/antiplatelets  -Dr. Lamar discussed at length with patient and daughter (via telephone) the benefits and risks of endovascular procedure with Right MMA embolization vs surgery with craniotomy for SDH evacuation vs observation. At this time patient with very slight left pronator drift but otherwise full strength in left hemibody. Recommend patient would benefit from Right MMA embolization. Daughter and patient in  agreement to proceed.  Plan for R MMA embolization tomorrow with Dr. Lamar. Keep NPO/IVF at midnight, preop labs (CBC, BMP, coags and Type and screen)  -Further recs post-procedure      Patient seen with Dr. Brianda Pederson PA-C  [facility]  10/31/2024 7:43 AM        Is this a shared or split note between Advanced Practice Provider and Physician? Yes              Attestation signed by Fransisco Lamar MD at 11/1/2024  2:08 PM (Updated)     Patient examined and assessed. Agree with above.      Patient presents with a large left convexity mixed density subdural hematoma, which remained stable on serial imaging.  She is not on any anticoagulation.  She has liver cirrhosis attributable to PRETTY.     She has trace proximal left arm weakness and is otherwise neurologically intact on my exam.     CTA reviewed.     I spoke to the patient and her daughter regarding the current clinical situation and plan of care.  We discussed the risks, benefits, alternatives, goals, and expectations of a diagnostic cerebral angiogram and right sided middle meningeal artery embolization in an effort to prevent hematoma expansion which may require surgery for hematoma evacuation.  After this discussion and answering their questions, the patient and her daughter expressed understanding and agreement with proceeding with the procedure as recommended.     Date of service 10/31/2024                10/31/24 Hospitalist     Chief Complaint: Headache nausea abnormal CT          -no AC, holding all antiplatelets and anticoagulants at this time  -neuro surgery consulted, recommend MMA embolization tomorrow with  n.p.o. past midnight  -neuro checks  Repeat CT reviewed today, shows stable size makes attenuation subdural hemorrhage in the right hemisphere, mild irregularity of the distal left vertebral artery could reflect changes of fibromuscular dysplasia  -hold an sedative  meds    11/1/24  Chief Complaint:  Mixed density R SDH     Subjective:  Pt examined, reports she continues to have severe HA when she gets up and ambulates. Worked with therapies yesterday and feels off balance and having difficulty coordinating her steps.      Objective/Physical Exam:     Vital Signs:  Blood pressure 136/64, pulse 57, temperature 98.6 °F (37 °C), temperature source Oral, resp. rate 18, weight 195 lb (88.5 kg), last menstrual period 11/04/2009, SpO2 90%, not currently breastfeeding.     Respiratory:  Respirations nonlabored     CV:  NSR on tele     General: NAD, Speech Fluent, Mood Appropriate     Neurologic: This patient is alert and orientated x 3.  Able to follow commands.  Face is symmetric. PERRLA +3 brisk, EOMI.  CN 2-12 GI,  Sensation to light touch is intact bilaterally.  Finger-to-nose coordination is intact.  Pronator drift not appreciated this morning  Neg Lindo's.  No clonus. 4+/5 strength left  and left triceps otherwise 5/5 in all other muscle groups of LUE. Strength 5/5 in RUE and BLE         Labs:        Lab Results   Component Value Date     WBC 5.0 11/01/2024     HGB 11.9 11/01/2024     HCT 36.8 11/01/2024     .0 11/01/2024     CREATSERUM 0.64 11/01/2024     BUN 12 11/01/2024      11/01/2024     K 3.8 11/01/2024      11/01/2024     CO2 28.0 11/01/2024      11/01/2024     CA 9.6 11/01/2024     PTT 31.2 11/01/2024     INR 1.13 11/01/2024     PTP 14.7 11/01/2024     MG 1.8 11/01/2024     PGLU 143 11/01/2024         Imaging:  CTA BRAIN + CTA CAROTIDS (CPT=70496/11673)     Result Date: 10/31/2024  CONCLUSION:   1. Stable size mixed attenuation subdural hemorrhage right hemisphere.  2. No signs of arterial occlusion or high-grade stenosis.  3. 33% stenosis left carotid bulb from primarily noncalcified plaque.  4. External carotid arteries are patent.  5. Note made that the right common carotid artery swings toward the midline, tortuous appearing.  6. Mild  irregularity of the distal left vertebral artery could reflect changes of fibromuscular dysplasia.  No aneurysm or dissection.    LOCATION:  [facility]   Dictated by (CST): Moreno Diaz MD on 10/31/2024 at 1:11 PM     Finalized by (CST): Moreno Diaz MD on 10/31/2024 at 1:18 PM        CT BRAIN OR HEAD (CPT=70450)     Result Date: 10/30/2024  CONCLUSION:  Acute on chronic right subdural hematoma.  Details as above.  Critical value test result called to Dr. Dunn in the ED with accurate read back.  1730 hours.    LOCATION:  [facility]   Dictated by (CST): Fiedl Aguero MD on 10/30/2024 at 5:32 PM     Finalized by (CST): Fidel Aguero MD on 10/30/2024 at 5:35 PM          Assessment:  Ivanna Meyer is a(n) 69 year old female with PMH T2DM, HTN, HLD, hypothyroidism, hepatic encephalopathy and liver cirrhosis secondary to PRETTY, PE presenting with HA and nausea found to have mixed density 1.4cm right subdural hematoma.     Plan:  -IR this morning for R MMA embolization  -Patient will be admitted to NICU post op for q1h neurochecks  -Further recs post procedure      Patient seen with Dr. Lamar       11/1/24 Neuro critical care     History of Presenting Illness  69 year old female with PMH of anxiety, cirrhosis, HTN, HLD, and DM who presents with acute on chronic right subdural hemorrhage now status post MMA embolization.  Patient presented to the hospital on 10/30 with headache and nausea.  CT demonstrated the above.  Monitored on the floor with stable exam, here now status post uncomplicated angio procedure as noted above. No new complaints post angio. R facial pain improved from day before.      1 year old female with PMH of anxiety, cirrhosis, HTN, HLD, and DM who presents with acute on chronic right subdural hemorrhage now status post MMA embolization.     Neurological:  Acute on subacute Right Subdural Hematoma         - Initial CT with above         - Nsg following, appreciate recs          - Now s/p R MMA  embolization         - PT/OT evals in AM     Anxiety - Continue home medications     Cardiovascular:  HTN         - SBP goal < 150         - Resume home medications         - IV labetalol/hydralazine pushes and Nicardipine gtt prn      HLD - Continue home med     Pulmonary:    - Satting well on RA, encourage IS      Renal:         - Monitor I&Os          - IVF while NPO     Gastrointestinal:  Nutrition:        - ADAT        - Bowel regimen: ordered prn     Cirrhosis  History of hepatic encephalopathy - Continue home propranolol, lactulose, PPI     Infectious Disease: - No leukocytosis, afebrile, continue to monitor      Heme/Onc   Anemia - Hb goal > 7, continue to monitor daily      Thrombocytopenia - Goal greater than 100 K, continue to monitor     Endocrine:  DM Type II, uncontrolled          - Hold PO Meds in the hospital         - HbA1c 6.6         - Accuchecks q6 with SSI prn      Checklist         - Lines: PIVs         - DVT Prophylaxis: SCDs          - Diet: ADAT         - IVF: Dc          - Electrolytes: Replete per protocol         - Code Status: FULL     Dispo: CNICU       Procedure Summary         Date: 11/01/24 Room / Location: [facility]     Anesthesia Start: 0739 Anesthesia Stop:      Procedure: IVS NEURO Diagnosis: (Subdural hematoma)     Scheduled Providers: Jeremy Crespo MD Anesthesiologist: Jeremy Crespo MD     Anesthesia Type: MAC ASA Status: 3        11/1/24 Hospitalist     Status post MMA embolization, admit to neuro ICU, SBP less than 150 to be managed by neuro critical care  -neuro checks  Repeat CT done on 10/31/2024 shows stable size makes attenuation subdural hemorrhage in the right hemisphere, mild irregularity of the distal left vertebral artery could reflect changes of fibromuscular dysplasia        Type 2 DM  -hold home oral meds  -low dose insulin sliding scale     HTN  -propranolol   -BP management per neurosurgery currently well-controlled at this  time  -IV labetalol and hydralazine pushes as well as nicardipine drip to keep SBP goal less than 150              LABORATORY DATA:  Last 24 hour labs were reviewed in detail.        Recent Labs   Lab 10/30/24  1600 10/31/24  0721 11/01/24  0425    137 141   K 4.2 4.5 3.8    107 107   CO2 27.0 23.0 28.0   GLU 79 136* 142*   BUN 10 9 12   CREATSERUM 0.64 0.63 0.64            Recent Labs   Lab 10/30/24  1600 10/31/24  1026 11/01/24  0425   WBC 7.4 5.3 5.0   HGB 12.3 11.8* 11.9*   .0 119.0* 109.0*           Recent Labs   Lab 10/30/24  1600 10/31/24  0721   ALT 25 21   AST 42* 44*           Recent Labs   Lab 10/31/24  0721 11/01/24  0425   MG 1.8 1.8         MEDICATIONS ADMINISTERED IN LAST 1 DAY:  acetaminophen (Tylenol Extra Strength) tab 500 mg       Date Action Dose Route User    11/1/2024 1225 Given 500 mg Oral Kasey Arellano RN          buPROPion ER (Wellbutrin XL) 24 hr tab 150 mg       Date Action Dose Route User    11/1/2024 1522 Given 150 mg Oral Kasey Arellano RN          fentaNYL (Sublimaze) 50 mcg/mL injection       Date Action Dose Route User    11/1/2024 0926 Given 25 mcg Intravenous Jeremy Crespo MD    11/1/2024 0843 Given 25 mcg Intravenous Jeremy Crespo MD    11/1/2024 0819 Given 25 mcg Intravenous Jeremy Crespo MD    11/1/2024 0754 Given 50 mcg Intravenous Jeremy Crespo MD    11/1/2024 0747 Given 25 mcg Intravenous Jeremy Crespo MD          gabapentin (Neurontin) cap 300 mg       Date Action Dose Route User    11/1/2024 1636 Given 300 mg Oral Kasey Arellano RN          insulin aspart (NovoLOG) 100 Units/mL FlexPen 1-5 Units       Date Action Dose Route User    11/1/2024 0531 Given 1 Units Subcutaneous (Left Upper Arm) Babita Cheatham    10/31/2024 2144 Given 1 Units Subcutaneous (Left Upper Arm) Babita Cheatham          iodixanol (VISIPAQUE) 320 MG/ML injection 200 mL       Date Action Dose Route User    11/1/2024 1001 Given 140 mL Intravenous Eduarda  MITRA Baeza          lactulose (CHRONULAC) 10 GM/15ML solution 20 g       Date Action Dose Route User    11/1/2024 1524 Given 20 g Oral Kasey Arellano RN          magnesium oxide (Mag-Ox) tab 400 mg       Date Action Dose Route User    10/31/2024 2030 Given 400 mg Oral Cheatham, Babita          midazolam (Versed) 2 MG/2ML injection       Date Action Dose Route User    11/1/2024 0926 Given 0.5 mg Intravenous Jeremy Crespo MD    11/1/2024 0843 Given 0.5 mg Intravenous Jeremy Crespo MD    11/1/2024 0819 Given 0.5 mg Intravenous Jeremy Crespo MD    11/1/2024 0752 Given 1 mg Intravenous Jeremy Crespo MD    11/1/2024 0746 Given 1 mg Intravenous Jeremy Crespo MD          oxyCODONE-acetaminophen (Percocet) 5-325 MG per tab 1 tablet       Date Action Dose Route User    10/31/2024 2030 Given 1 tablet Oral Cheatham, Babita          pantoprazole (Protonix) DR tab 40 mg       Date Action Dose Route User    11/1/2024 0548 Given 40 mg Oral Cheatham, Babita          propranolol (Inderal) tab 10 mg       Date Action Dose Route User    11/1/2024 1522 Given 10 mg Oral Kasey Arellano RN    10/31/2024 2030 Given 10 mg Oral Cheatham, Babita          rosuvastatin (Crestor) tab 5 mg       Date Action Dose Route User    10/31/2024 2030 Given 5 mg Oral Cheatham, Babita          sodium chloride 0.9% infusion       Date Action Dose Route User    11/1/2024 0739 New Bag (none) Intravenous Jeremy Crespo MD          sodium chloride 0.9% infusion       Date Action Dose Route User    11/1/2024 1025 New Bag (none) Intravenous Kasey Arellano RN          cholecalciferol (Vitamin D3) tab 2,000 Units       Date Action Dose Route User    11/1/2024 1522 Given 2,000 Units Oral Kasey Arellano RN          zolpidem (Ambien) tab 5 mg       Date Action Dose Route User    10/31/2024 2029 Given 5 mg Oral Cheatham, Babita          sertraline (Zoloft) tab 150 mg       Date Action Dose Route User    11/1/2024 1522 Given 150 mg Oral  Kasey Arellano, RN            Vitals (last day)       Date/Time Temp Pulse Resp BP SpO2 Weight O2 Device O2 Flow Rate (L/min) [facility]    11/01/24 1500 -- 67 19 134/105 91 % -- -- --     11/01/24 1400 -- 59 16 141/76 96 % -- -- --     11/01/24 1300 -- 58 24 141/67 94 % -- -- --     11/01/24 1245 -- 58 19 -- 95 % -- -- --     11/01/24 1200 97.1 °F (36.2 °C) 58 15 161/72 96 % -- -- --     11/01/24 1100 -- 60 17 143/66 97 % -- -- --     11/01/24 1045 -- 60 18 123/81 95 % -- -- --     11/01/24 1030 -- 61 20 130/72 95 % -- -- --     11/01/24 1015 -- 59 17 103/77 91 % -- -- --     11/01/24 1011 98 °F (36.7 °C) 61 16 104/75 100 % -- -- --     11/01/24 1010 97 °F (36.1 °C) 80 14 103/77 92 % -- None (Room air) --     11/01/24 0400 98.6 °F (37 °C) 57 18 136/64 90 % -- None (Room air) 0 L/min     11/01/24 0000 98.1 °F (36.7 °C) 64 18 137/62 94 % -- -- -- HT    10/31/24 2000 98.3 °F (36.8 °C) 65 18 109/78 92 % -- -- -- HT    10/31/24 1630 98.1 °F (36.7 °C) 59 19 124/54 94 % -- None (Room air) -- CV    10/31/24 1140 98 °F (36.7 °C) 57 -- 135/72 -- -- None (Room air) -- CV    10/31/24 0810 -- 63 -- -- 92 % -- -- -- CV    10/31/24 0800 98.1 °F (36.7 °C) 61 19 121/70 90 % -- None (Room air) -- CV    10/31/24 0400 98.4 °F (36.9 °C) 68 18 134/72 91 % -- Nasal cannula 2 L/min EN    10/31/24 0000 98.8 °F (37.1 °C) 62 18 134/61 93 % -- Nasal cannula 2 L/min EN

## 2024-11-01 NOTE — PROGRESS NOTES
[facility]   part of [facility]     Hospitalist Progress Note     Ivanna Meyer Patient Status:  Inpatient    1955 MRN [id number]   [facility] 3NE-A Attending Yaw Brand, DO   Hosp Day # 1 PCP Demarcus Mcrae MD     Chief Complaint: Headache nausea abnormal CT    Subjective:     Patient doing better today, slight headache no nausea no vomiting, status post MMA embolization currently in the neuro ICU  Objective:    Review of Systems:   A comprehensive review of systems was completed; pertinent positive and negatives stated in subjective.    Vital signs:  Temp:  [97 °F (36.1 °C)-98.6 °F (37 °C)] 97.1 °F (36.2 °C)  Pulse:  [57-80] 67  Resp:  [14-24] 19  BP: (103-161)/() 134/105  SpO2:  [90 %-100 %] 91 %    General: No acute distress. Alert and oriented  HEENT: Normocephalic atraumatic. Moist mucous membranes. EOM-I.   Neck: No JVD. No carotid bruits.  Respiratory: Clear to auscultation bilaterally. No wheezes. No crackles  Cardiovascular: S1, S2. Regular rate and rhythm. No murmurs  Chest and Back: No tenderness or deformity.  Abdomen: Soft, nontender, nondistended.  Positive bowel sounds. No rebound, guarding   Neurologic: No focal neurological deficits. CNII-XII grossly intact. Sensation and strength intact  Musculoskeletal: Moves all extremities.  Right groin incision clean dry and intact  Extremities: No edema or tenderness of the LE  Integument: No new rashes or lesions.   Psychiatric: Appropriate mood and affect.        Diagnostic Data:    Labs:  Recent Labs   Lab 10/30/24  1600 10/31/24  1026 24  0425   WBC 7.4 5.3 5.0   HGB 12.3 11.8* 11.9*   MCV 92.5 91.9 92.9   .0 119.0* 109.0*   INR 1.05  --  1.13       Recent Labs   Lab 10/30/24  1600 10/31/24  0721 24  0425   GLU 79 136* 142*   BUN 10 9 12   CREATSERUM 0.64 0.63 0.64   CA 9.7 9.7 9.6   ALB 4.3 3.9  --     137 141   K 4.2 4.5 3.8    107 107   CO2 27.0 23.0 28.0   ALKPHO  129 115  --    AST 42* 44*  --    ALT 25 21  --    BILT 0.6 0.6  --    TP 7.4 6.9  --        CrCl cannot be calculated (Unknown ideal weight.).    No results for input(s): \"TROP\", \"TROPHS\", \"CK\" in the last 168 hours.    Recent Labs   Lab 10/30/24  1600 11/01/24  0425   PTP 13.8 14.7   INR 1.05 1.13                  Microbiology    No results found for this visit on 10/30/24.      Imaging: Reviewed in Epic.    Medications:    gabapentin  300 mg Oral TID    cholecalciferol  2,000 Units Oral Daily    lactulose  20 g Oral TID    pantoprazole  40 mg Oral QAM AC    propranolol  10 mg Oral TID    sertraline  150 mg Oral Daily    insulin aspart  1-5 Units Subcutaneous TID AC and HS    buPROPion ER  150 mg Oral Daily    rosuvastatin  5 mg Oral Nightly       Assessment & Plan:        69 year old female with anxiety, depression, type 2 DM, htn, hld, hypothyroidism, hx hepatic encephalopathy, liver cirrhosis secondary to PRETTY, hx PE and FREDO not on CPAP presenting with headache, nausea and abnormal CT head.      Acute on Chronic SDH  -etiology uncertain  -likely cause of headache and nausea  -possibly secondary to falls  -no AC, holding all antiplatelets and anticoagulants at this time  -neuro surgery consulted,   -Status post MMA embolization, admit to neuro ICU, SBP less than 150 to be managed by neuro critical care  -neuro checks  Repeat CT done on 10/31/2024 shows stable size makes attenuation subdural hemorrhage in the right hemisphere, mild irregularity of the distal left vertebral artery could reflect changes of fibromuscular dysplasia       Type 2 DM  -hold home oral meds  -low dose insulin sliding scale     HTN  -propranolol   -BP management per neurosurgery currently well-controlled at this time  -IV labetalol and hydralazine pushes as well as nicardipine drip to keep SBP goal less than 150     HLD  -atorvastatin      Anxiety  Depression  -sertraline  -Bupropion      Hx Liver cirrhosis  Hx hepatic encephalopathy    -lactulose  -propranolol   -pantoprazole    Patrick Kamara MD    Supplementary Documentation:     Quality:  DVT Mechanical Prophylaxis:   SCDs,    DVT Pharmacologic Prophylaxis   Medication   None                Code Status: Full Code  Cobb: No urinary catheter in place  Cobb Duration (in days):   Central line:    ELENA:       The 21st Century Cures Act makes medical notes like these available to patients in the interest of transparency. Please be advised this is a medical document. Medical documents are intended to carry relevant information, facts as evident, and the clinical opinion of the practitioner. The medical note is intended as peer to peer communication and may appear blunt or direct. It is written in medical language and may contain abbreviations or verbiage that are unfamiliar.              **Certification      PHYSICIAN Certification of Need for Inpatient Hospitalization - Initial Certification    Patient will require inpatient services that will reasonably be expected to span two midnight's based on the clinical documentation in H+P.   Based on patients current state of illness, I anticipate that, after discharge, patient will require TBD.

## 2024-11-01 NOTE — PLAN OF CARE
Assumed care @ 1930  A&ox4  RA   NSR on tele  NeuroQ4  Meds in MAR given  NPO midnight   Ambulates standby w walker  PIV left antecubital saline locked  Safety precautions in place  Continue to meet pt needs  Need further details reference flowsheets  Problem: Diabetes/Glucose Control  Goal: Glucose maintained within prescribed range  Description: INTERVENTIONS:  - Monitor Blood Glucose as ordered  - Assess for signs and symptoms of hyperglycemia and hypoglycemia  - Administer ordered medications to maintain glucose within target range  - Assess barriers to adequate nutritional intake and initiate nutrition consult as needed  - Instruct patient on self management of diabetes  Outcome: Progressing       Problem: PAIN - ADULT  Goal: Verbalizes/displays adequate comfort level or patient's stated pain goal  Description: INTERVENTIONS:  - Encourage pt to monitor pain and request assistance  - Assess pain using appropriate pain scale  - Administer analgesics based on type and severity of pain and evaluate response  - Implement non-pharmacological measures as appropriate and evaluate response  - Consider cultural and social influences on pain and pain management  - Manage/alleviate anxiety  - Utilize distraction and/or relaxation techniques  - Monitor for opioid side effects  - Notify MD/LIP if interventions unsuccessful or patient reports new pain  - Anticipate increased pain with activity and pre-medicate as appropriate  Outcome: Progressing

## 2024-11-01 NOTE — PROGRESS NOTES
[facility]  Neurosurgery Progress Note    Ivanna Meyer Patient Status:  Inpatient    1955 MRN [id number]   Location [facility] 3NE-A Attending Yaw Brand, DO   Hosp Day # 1 PCP Demarcus Mcrae MD     Chief Complaint:  Mixed density R SDH    Subjective:  Pt examined, reports she continues to have severe HA when she gets up and ambulates. Worked with therapies yesterday and feels off balance and having difficulty coordinating her steps.     Objective/Physical Exam:    Vital Signs:  Blood pressure 136/64, pulse 57, temperature 98.6 °F (37 °C), temperature source Oral, resp. rate 18, weight 195 lb (88.5 kg), last menstrual period 2009, SpO2 90%, not currently breastfeeding.    Respiratory:  Respirations nonlabored    CV:  NSR on tele    General: NAD, Speech Fluent, Mood Appropriate    Neurologic: This patient is alert and orientated x 3.  Able to follow commands.  Face is symmetric. PERRLA +3 brisk, EOMI.  CN 2-12 GI,  Sensation to light touch is intact bilaterally.  Finger-to-nose coordination is intact.  Pronator drift not appreciated this morning  Neg Lindo's.  No clonus. 4+/5 strength left  and left triceps otherwise 5/5 in all other muscle groups of LUE. Strength 5/5 in RUE and BLE       Labs:  Lab Results   Component Value Date    WBC 5.0 2024    HGB 11.9 2024    HCT 36.8 2024    .0 2024    CREATSERUM 0.64 2024    BUN 12 2024     2024    K 3.8 2024     2024    CO2 28.0 2024     2024    CA 9.6 2024    PTT 31.2 2024    INR 1.13 2024    PTP 14.7 2024    MG 1.8 2024    PGLU 143 2024       Imaging:  CTA BRAIN + CTA CAROTIDS (CPT=70496/54190)    Result Date: 10/31/2024  CONCLUSION:   1. Stable size mixed attenuation subdural hemorrhage right hemisphere.  2. No signs of arterial occlusion or high-grade stenosis.  3. 33% stenosis left carotid  bulb from primarily noncalcified plaque.  4. External carotid arteries are patent.  5. Note made that the right common carotid artery swings toward the midline, tortuous appearing.  6. Mild irregularity of the distal left vertebral artery could reflect changes of fibromuscular dysplasia.  No aneurysm or dissection.    LOCATION:  [facility]   Dictated by (CST): Moreno Diaz MD on 10/31/2024 at 1:11 PM     Finalized by (CST): Moreno Diaz MD on 10/31/2024 at 1:18 PM       CT BRAIN OR HEAD (CPT=70450)    Result Date: 10/30/2024  CONCLUSION:  Acute on chronic right subdural hematoma.  Details as above.  Critical value test result called to Dr. Dunn in the ED with accurate read back.  1730 hours.    LOCATION:  [facility]   Dictated by (CST): Fidel Aguero MD on 10/30/2024 at 5:32 PM     Finalized by (CST): Fidel Aguero MD on 10/30/2024 at 5:35 PM         Assessment:  Ivanna Meyer is a(n) 69 year old female with PMH T2DM, HTN, HLD, hypothyroidism, hepatic encephalopathy and liver cirrhosis secondary to PRETTY, PE presenting with HA and nausea found to have mixed density 1.4cm right subdural hematoma.     Plan:  -IR this morning for R MMA embolization  -Patient will be admitted to NICU post op for q1h neurochecks  -Further recs post procedure      Patient seen with Dr. Brianda Pederson PA-C  [facility] [place]  11/1/2024 7:27 AM     Is this a shared or split note between Advanced Practice Provider and Physician? Yes

## 2024-11-01 NOTE — BRIEF OP NOTE
Pre-Operative Diagnosis: R SDH     Post-Operative Diagnosis: Same     Procedure Performed: DCA and R MMA embolization    Attending: Brianda     Findings: Successful R MMA particle and coil embolization     Plan:  Q1' neuro checks  Right leg straight x 2 hours  -160  Plan for discharge home tomorrow pending clinical stability

## 2024-11-02 VITALS
OXYGEN SATURATION: 92 % | RESPIRATION RATE: 17 BRPM | DIASTOLIC BLOOD PRESSURE: 101 MMHG | HEART RATE: 59 BPM | WEIGHT: 195 LBS | SYSTOLIC BLOOD PRESSURE: 116 MMHG | TEMPERATURE: 98 F | BODY MASS INDEX: 36 KG/M2

## 2024-11-02 PROBLEM — R41.3 MEMORY CHANGE: Status: ACTIVE | Noted: 2021-07-30

## 2024-11-02 LAB
ANION GAP SERPL CALC-SCNC: 1 MMOL/L (ref 0–18)
BASOPHILS # BLD AUTO: 0.02 X10(3) UL (ref 0–0.2)
BASOPHILS NFR BLD AUTO: 0.4 %
BUN BLD-MCNC: 11 MG/DL (ref 9–23)
CALCIUM BLD-MCNC: 9.4 MG/DL (ref 8.7–10.4)
CHLORIDE SERPL-SCNC: 109 MMOL/L (ref 98–112)
CO2 SERPL-SCNC: 30 MMOL/L (ref 21–32)
CREAT BLD-MCNC: 0.71 MG/DL
EGFRCR SERPLBLD CKD-EPI 2021: 91 ML/MIN/1.73M2 (ref 60–?)
EOSINOPHIL # BLD AUTO: 0 X10(3) UL (ref 0–0.7)
EOSINOPHIL NFR BLD AUTO: 0 %
ERYTHROCYTE [DISTWIDTH] IN BLOOD BY AUTOMATED COUNT: 14.4 %
GLUCOSE BLD-MCNC: 137 MG/DL (ref 70–99)
GLUCOSE BLD-MCNC: 143 MG/DL (ref 70–99)
GLUCOSE BLD-MCNC: 167 MG/DL (ref 70–99)
HCT VFR BLD AUTO: 33 %
HGB BLD-MCNC: 11.1 G/DL
IMM GRANULOCYTES # BLD AUTO: 0.02 X10(3) UL (ref 0–1)
IMM GRANULOCYTES NFR BLD: 0.4 %
LYMPHOCYTES # BLD AUTO: 0.91 X10(3) UL (ref 1–4)
LYMPHOCYTES NFR BLD AUTO: 16 %
MCH RBC QN AUTO: 30.9 PG (ref 26–34)
MCHC RBC AUTO-ENTMCNC: 33.6 G/DL (ref 31–37)
MCV RBC AUTO: 91.9 FL
MONOCYTES # BLD AUTO: 0.58 X10(3) UL (ref 0.1–1)
MONOCYTES NFR BLD AUTO: 10.2 %
NEUTROPHILS # BLD AUTO: 4.15 X10 (3) UL (ref 1.5–7.7)
NEUTROPHILS # BLD AUTO: 4.15 X10(3) UL (ref 1.5–7.7)
NEUTROPHILS NFR BLD AUTO: 73 %
OSMOLALITY SERPL CALC.SUM OF ELEC: 292 MOSM/KG (ref 275–295)
PLATELET # BLD AUTO: 116 10(3)UL (ref 150–450)
POTASSIUM SERPL-SCNC: 3.8 MMOL/L (ref 3.5–5.1)
RBC # BLD AUTO: 3.59 X10(6)UL
SODIUM SERPL-SCNC: 140 MMOL/L (ref 136–145)
WBC # BLD AUTO: 5.7 X10(3) UL (ref 4–11)

## 2024-11-02 PROCEDURE — 99291 CRITICAL CARE FIRST HOUR: CPT | Performed by: OTHER

## 2024-11-02 RX ORDER — LABETALOL HYDROCHLORIDE 5 MG/ML
INJECTION, SOLUTION INTRAVENOUS
Status: COMPLETED
Start: 2024-11-02 | End: 2024-11-02

## 2024-11-02 RX ORDER — MAGNESIUM OXIDE 400 MG/1
400 TABLET ORAL ONCE
Status: COMPLETED | OUTPATIENT
Start: 2024-11-02 | End: 2024-11-02

## 2024-11-02 RX ORDER — BUTALBITAL, ACETAMINOPHEN AND CAFFEINE 50; 325; 40 MG/1; MG/1; MG/1
1-2 TABLET ORAL EVERY 6 HOURS PRN
Qty: 20 TABLET | Refills: 0 | Status: SHIPPED | OUTPATIENT
Start: 2024-11-02

## 2024-11-02 RX ORDER — BUTALBITAL, ACETAMINOPHEN AND CAFFEINE 50; 325; 40 MG/1; MG/1; MG/1
1 TABLET ORAL ONCE
Status: COMPLETED | OUTPATIENT
Start: 2024-11-02 | End: 2024-11-02

## 2024-11-02 RX ORDER — PANTOPRAZOLE SODIUM 40 MG/1
40 TABLET, DELAYED RELEASE ORAL
Qty: 30 TABLET | Refills: 0 | Status: SHIPPED | OUTPATIENT
Start: 2024-11-03

## 2024-11-02 NOTE — PHYSICAL THERAPY NOTE
PHYSICAL THERAPY TREATMENT NOTE - INPATIENT    Room Number: 6620/6620-A     Session: 1     Number of Visits to Meet Established Goals: 3    Presenting Problem: Acute Subdural Hematoma  Co-Morbidities : HTN, anxiety, DM II, Hypothyroidism    PHYSICAL THERAPY MEDICAL/SOCIAL HISTORY  History related to current admission: Patient is a 69 year old female admitted on 10/30/2024 from Home for decrease balance, headaches and nausea.  Pt diagnosed with Acute Subdural Hematoma.     HOME SITUATION  Type of Home: House  Home Layout: Multi-level                     Lives With: Other (Comment) (ex-)    Drives: No           Prior Level of [place]: Pt reports that she lives in a house with her ex-spouse. Pt states that recently she has been needing assistance with her ADLs and requires the use of the RW to ambulate. Pt states that her ex- is her caregiver. Pt reports that she has had multiple of falls that comes unexpectedly.  Pt reports that she at times has memory problems.    PHYSICAL THERAPY ASSESSMENT   Patient demonstrates good  progress this session, goals  remain in progress.      Patient is requiring contact guard assist as a result of the following impairments: decreased endurance/aerobic capacity, impaired gait balance, and decreased muscular endurance.     Patient continues to function near baseline with bed mobility, transfers, and gait.  Next session anticipate patient to progress bed mobility, transfers, and gait.  Physical Therapy will continue to follow patient for duration of hospitalization.    Patient continues to benefit from continued skilled PT services: at discharge to promote prior level of function and safety with additional support and return home with home health PT.    PLAN DURING HOSPITALIZATION  Nursing Mobility Recommendation : 1 Assist  PT Device Recommendation: Rolling walker  PT Treatment Plan: Bed mobility;Body mechanics;Gait training;Strengthening;Stair training;Transfer  training;Balance training  Frequency (Obs): 3-5x/week     CURRENT GOALS     Goal #1 Patient is able to demonstrate supine - sit EOB @ level: supervision      Goal #2 Patient is able to demonstrate transfers EOB to/from [facility] at assistance level: supervision      Goal #3 Patient is able to ambulate 150 feet with assist device: walker - rolling at assistance level: supervision      Goal #4     Goal #5     Goal #6     Goal Comments: Goals established on 10/31/2024  2024 all goals ongoing    SUBJECTIVE  \"I am feeling fine\"    OBJECTIVE  Precautions:  (SBP <150)    WEIGHT BEARING RESTRICTION     PAIN ASSESSMENT   Ratin  Location: Pt reports no pain       BALANCE                                                                                                                       Static Sitting: Fair +  Dynamic Sitting: Fair +           Static Standing: Fair  Dynamic Standing: Fair -    ACTIVITY TOLERANCE                         O2 WALK       AM-PAC '6-Clicks' INPATIENT SHORT FORM - BASIC MOBILITY  How much difficulty does the patient currently have...  Patient Difficulty: Turning over in bed (including adjusting bedclothes, sheets and blankets)?: A Little   Patient Difficulty: Sitting down on and standing up from a chair with arms (e.g., wheelchair, bedside commode, etc.): A Little   Patient Difficulty: Moving from lying on back to sitting on the side of the bed?: A Little   How much help from another person does the patient currently need...   Help from Another: Moving to and from a bed to a chair (including a wheelchair)?: A Little   Help from Another: Need to walk in hospital room?: A Little   Help from Another: Climbing 3-5 steps with a railing?: A Little     AM-PAC Score:  Raw Score: 18   Approx Degree of Impairment: 46.58%   Standardized Score (AM-PAC Scale): 43.63   CMS Modifier (G-Code): CK    FUNCTIONAL ABILITY STATUS  Gait Assessment   Functional Mobility/Gait Assessment  Gait Assistance: Contact guard  assist  Distance (ft): 100  Assistive Device: Rolling walker  Pattern: Shuffle (R ER)    Skilled Therapy Provided    Bed Mobility:  Rolling: Sup   Supine<>Sit: Sup   Sit<>Supine: Sup     Transfer Mobility:  Sit<>Stand: Sup   Stand<>Sit: Sup   Gait: CGA 100ft using RW    Therapist's Comments: Pt was observed with no LOB when ambulating. However pt did demonstrate an increase in ER on the R hip. Pt states that she has not notice and is able to correct it however pt reverts back to the ER on the R.    PT underwent  11/1 MMA embolization    Patient End of Session: In bed;Call light within reach;Needs met;RN aware of session/findings;All patient questions and concerns addressed;Alarm set    PT Session Time: 23 minutes  Therapeutic Activity: 23 minutes

## 2024-11-02 NOTE — PROGRESS NOTES
[facility]   NEUROCRITICAL CARE   PROGRESS NOTE    Admission date: 10/30/2024  Reason for Consult: SDH  Chief Complaint:   Chief Complaint   Patient presents with    Nausea/vomiting    Headache   ________________________________________________________________    SUBJECTIVE     24 Hour Significant Events: Neurologically hemodynamically stable overnight.  Intermittently confused prior to giving lactulose yesterday evening. Better this AM, intermittent headache improved with medications.     OBJECTIVE   VITAL SIGNS:   Temp:  [96.7 °F (35.9 °C)-98 °F (36.7 °C)] 96.7 °F (35.9 °C)  Pulse:  [53-80] 67  Resp:  [14-27] 24  BP: (103-175)/() 132/61  SpO2:  [90 %-100 %] 91 %    INTAKE/OUTPUT:  Intake/Output Summary (Last 24 hours) at 11/2/2024 0915  Last data filed at 11/2/2024 0858  Gross per 24 hour   Intake 856 ml   Output 650 ml   Net 206 ml     PHYSICAL EXAM:  GENERAL APPEARANCE: Patient resting comfortably in bed, NAD  HEENT: Normocephalic, atraumatic.   LUNGS: Normal respiratory rate and effort   HEART: Regular rate and rhythm   ABDOMEN: Soft. No tenderness, guarding, or rebound.     NEUROLOGIC:    Mental Status:  A&O x 4, Follows simple commands, no obvious aphasia or dysarthria  Cranial nerves: PERRL.  Visual fields full.  EOMI.  Face symmetric with normal movement bilaterally.  Hearing grossly intact. Tongue midline with normal movements.   Motor: Drift:  BL UE drift mild; Motor exam is 5 out of 5 in all extremities bilaterally  Sensation: Intact to light touch bilaterally  Cerebellar: Normal Finger-To-Nose test    LABORATORY DATA:  Last 24 hour labs were reviewed in detail.  Recent Labs   Lab 10/31/24  0721 11/01/24  0425 11/02/24  0328    141 140   K 4.5 3.8 3.8    107 109   CO2 23.0 28.0 30.0   * 142* 143*   BUN 9 12 11   CREATSERUM 0.63 0.64 0.71     Recent Labs   Lab 10/31/24  1026 11/01/24  0425 11/02/24  0328   WBC 5.3 5.0 5.7   HGB 11.8* 11.9* 11.1*   .0*  109.0* 116.0*     Recent Labs   Lab 10/30/24  1600 10/31/24  0721   ALT 25 21   AST 42* 44*     Recent Labs   Lab 10/31/24  0721 11/01/24  0425   MG 1.8 1.8       Scheduled Meds:   gabapentin  300 mg Oral TID    cholecalciferol  2,000 Units Oral Daily    lactulose  20 g Oral TID    pantoprazole  40 mg Oral QAM AC    propranolol  10 mg Oral TID    sertraline  150 mg Oral Daily    insulin aspart  1-5 Units Subcutaneous TID AC and HS    buPROPion ER  150 mg Oral Daily    rosuvastatin  5 mg Oral Nightly     Continuous Infusions:   niCARdipine       PRN Meds:  acetaminophen **OR** acetaminophen    morphINE **OR** morphINE    ondansetron    metoclopramide    niCARdipine    oxyCODONE-acetaminophen **OR** oxyCODONE-acetaminophen    glucose **OR** glucose **OR** glucose-vitamin C **OR** dextrose **OR** glucose **OR** glucose **OR** glucose-vitamin C    acetaminophen    zolpidem    Radiology:    No results found.  ASSESSMENT/PLAN   69 year old female with PMH of anxiety, cirrhosis, HTN, HLD, and DM who presents with acute on chronic right subdural hemorrhage now status post MMA embolization.     Neurological:  Acute on subacute Right Subdural Hematoma         - Initial CT with above         - Nsg following, appreciate recs          - s/p R MMA embolization 11/1         - PT/OT evals      Memory issues - outpatient neuro eval after acute phase of care has improved     Anxiety - Continue home medications     Cardiovascular:  HTN         - SBP goal < 150         - Resume home medications         - IV labetalol/hydralazine pushes prn      HLD - Continue home med     Pulmonary:    - Satting well on RA, encourage IS      Renal:  - Monitor I&Os        Gastrointestinal:  Nutrition:        - ADAT        - Bowel regimen: ordered prn     Cirrhosis  History of hepatic encephalopathy - Continue home propranolol, lactulose, PPI     Infectious Disease: - No leukocytosis, afebrile, continue to monitor      Heme/Onc   Anemia - Hb goal > 7,  continue to monitor daily      Thrombocytopenia - Goal greater than 100 K, continue to monitor     Endocrine:  DM Type II, uncontrolled          - Hold PO Meds in the hospital         - HbA1c 6.6         - Accuchecks q6 with SSI prn      Checklist         - Lines: PIVs         - DVT Prophylaxis: SCDs          - Diet: ADAT         - IVF: Dc          - Electrolytes: Replete per protocol         - Code Status: FULL     Dispo: CNICU pending neuro IR clearance     Greater than 35 minutes of critical care time (exclusive of billable procedures) was administered to manage and/or prevent neurologic instability. This involved direct patient intervention, complex decision making, and/or extensive discussions with the patient, family, and clinical staff.     Brad Lawson MD  Neurocritical Care  [facility]     Disclaimer: This record was dictated using Dragon software. There may be errors due to voice recognition problems that were not realized and corrected during the completion of the note.

## 2024-11-02 NOTE — PLAN OF CARE
Received pt from  this am. Neuro assessment intact. Pt had some slight confusion but was able to answer all orientation questions correctly and appropriate. Requiring some PRN labetalol for SBP>160. Tylenol given for moderate head ache. R groin site soft with no hematoma. Pulses palpable. Up to chair and bathroom this evening and tolerated well.

## 2024-11-02 NOTE — DISCHARGE INSTRUCTIONS
Sometimes managing your health at home requires assistance.  The [facility] team has recognized your preference to use [facility].  They can be reached by phone at [phone].  The fax number for your reference is [phone].. A representative from the home health agency will contact you or your family to schedule your first visit.

## 2024-11-02 NOTE — PLAN OF CARE
Assumed care of Ivanna at 1930 s/p MMA embolization  & coiling during the day. She is neurologically intact- alert/oriented x 4; mildy confused during conversation. Denies visual disturbance. With headache relieved by Tylenol. EOMS intact, PERRLA brisk.Following commands; no focal weakness 5/5. SB- NSR on tele. SBP at goal < 160. R groin soft/ no hematoma ; r pedal pulse easily palpable.  0630- No acute event over night. Pt tx to evaluate. Anticipating  discharge home today.

## 2024-11-02 NOTE — CM/SW NOTE
Anticipated therapy need: Home with Home Healthcare    Referral pending on aidin.    Addendum 11:50am: [facility] able to accept for HH.    TRISHA Daigle  Discharge Planner

## 2024-11-02 NOTE — DISCHARGE SUMMARY
General Medicine Discharge Summary     Patient ID:  Ivanna Meyer  69 year old  7/8/1955    Admit date: 10/30/2024    Discharge date and time: 11/2/2024    Attending Physician: Yaw Brand DO     Primary Care Physician: Demarcus Mcrae MD     Reason for admission: see HPI    Discharge Diagnoses: Acute subdural hematoma (HCC) [S06.5XAA]    Discharged Condition: good    Exam: (see progress notes for full details)  No acute distress, alert and oriented    Hospital Course:  Ivanna Meyer is a 69 year old female with anxiety, depression, type 2 DM, htn, hld, hypothyroidism, hx hepatic encephalopathy, liver cirrhosis secondary to PRETTY, hx PE and FREDO not on CPAP presenting with headache, nausea and abnormal CT head. Patient says that for a couple months she had had on and off headaches followed by nausea over the last few weeks. Patient actually was in the IM department for falls and confusion at which time a ct brain was ordered. It appears she completed it a couple weeks latter at which time it showed subacute to chronic subdural hematoma. Patient says she was not notified of this. She was at her psychiatrist office today who noticed these results and directed the patient to the ER for further evaluation given her continued symptoms. Patient denies any other significant positive review of systems.       Acute on Chronic SDH  -etiology uncertain  -likely cause of headache and nausea  -possibly secondary to falls  -no AC, holding all antiplatelets and anticoagulants at this time  -neuro surgery consulted,   -Status post MMA embolization, admit to neuro ICU, SBP less than 150 to be managed by neuro critical care  -neuro checks  Repeat CT done on 10/31/2024 shows stable size makes attenuation subdural hemorrhage in the right hemisphere, mild irregularity of the distal left vertebral artery could reflect changes of fibromuscular dysplasia  -Cleared by neurosurgery for discharge today, BP  management per neurosurgery okay to discharge  -Given Fioricet for pain control        Type 2 DM  -hold home oral meds  -low dose insulin sliding scale     HTN  -propranolol   -BP management per neurosurgery currently well-controlled at this time  -IV labetalol and hydralazine pushes as well as nicardipine drip to keep SBP goal less than 150     HLD  -atorvastatin      Anxiety  Depression  -sertraline  -Bupropion      Hx Liver cirrhosis  Hx hepatic encephalopathy   -lactulose  -propranolol   -pantoprazole    Consults: IP CONSULT TO NEUROSURGERY  IP CONSULT TO SOCIAL WORK    Operative Procedures:      Disposition: home    Patient Instructions:   Current Discharge Medication List        START taking these medications    Details   pantoprazole 40 MG Oral Tab EC Take 1 tablet (40 mg total) by mouth every morning before breakfast.      butalbital-acetaminophen-caffeine -40 MG Oral Tab Take 1-2 tablets by mouth every 6 (six) hours as needed for Pain.           CONTINUE these medications which have NOT CHANGED    Details   gabapentin 300 MG Oral Cap Take 1 capsule (300 mg total) by mouth 3 (three) times daily.      buPROPion  MG Oral Tablet 24 Hr Take 1 tablet (150 mg total) by mouth daily.      MILK THISTLE OR Take by mouth.      glimepiride 2 MG Oral Tab Take 1 tablet (2 mg total) by mouth daily with breakfast.      zolpidem 5 MG Oral Tab Take 1 tablet (5 mg total) by mouth nightly as needed.      oxyCODONE-acetaminophen 5-325 MG Oral Tab Take 1-2 tablets by mouth every 6 (six) hours as needed for Pain.      lactulose 10 GM/15ML Oral Solution Take 30 mL (20 g total) by mouth 3 (three) times daily.      SERTRALINE 100 MG Oral Tab TAKE 1 TABLET BY MOUTH EVERY DAY      PROPRANOLOL 10 MG Oral Tab TAKE 1 TABLET BY MOUTH THREE TIMES A DAY      METFORMIN HCL 1000 MG Oral Tab TAKE 1 TABLET BY MOUTH TWICE A DAY      Multiple Vitamin (MULTI-VITAMIN DAILY) Oral Tab Take by mouth daily.        Cholecalciferol (VITAMIN D)  50 MCG (2000 UT) Oral Cap Take by mouth daily.        rosuvastatin 5 MG Oral Tab Take 1 tablet (5 mg total) by mouth nightly.      !! ACCU-CHEK FASTCLIX LANCETS Does not apply Misc Once daily      Glucose Blood (ACCU-CHEK GIANA) In Vitro Strip Use one strip to test blood sugar once daily.  Diagnosed E11.9      !! Accu-Chek Softclix Lancets Does not apply Misc Use to test blood glucose daily.       !! - Potential duplicate medications found. Please discuss with provider.        STOP taking these medications       omeprazole 20 MG Oral Capsule Delayed Release                I reconciled current and discharge medications on day of discharge    Follow-up with PCP, neurosurgery    No orders of the defined types were placed in this encounter.          Total Time Coordinating Care: Greater than 30 minutes    Patient had opportunity to ask questions and state understand and agree with therapeutic plan as outlined above.     Thank You,  Patrick cervantes MD

## 2024-11-02 NOTE — PROGRESS NOTES
[facility]  Neurosurgery Progress Note  2024    Ivanna Meyer Patient Status:  Inpatient    1955 MRN [id number]   [facility] 6NE-A Attending Yaw Brand, DO   Hosp Day # 2 PCP Demarcus Mcrae MD     SUBJECTIVE:  Ivanna Meyer is a(n) 69 year old female status post MMA embolization for subdural hematoma  She has no complaints morning  She feels good      OBJECTIVE / PHYSICAL EXAM:  Vital Signs:  Blood pressure 122/76, pulse 64, temperature 96.7 °F (35.9 °C), temperature source Temporal, resp. rate 19, weight 195 lb (88.5 kg), last menstrual period 2009, SpO2 94%, not currently breastfeeding.  Awake alert, orient x 3  Follows commands x 4  Up walking with PT      Lab Results (last 24 hours):  Recent Results (from the past 24 hours)   POCT Glucose    Collection Time: 24 10:35 AM   Result Value Ref Range    POC Glucose 130 (H) 70 - 99 mg/dL   POCT Glucose    Collection Time: 24  4:45 PM   Result Value Ref Range    POC Glucose 216 (H) 70 - 99 mg/dL   POCT Glucose    Collection Time: 24  6:11 PM   Result Value Ref Range    POC Glucose 165 (H) 70 - 99 mg/dL   POCT Glucose    Collection Time: 24  8:31 PM   Result Value Ref Range    POC Glucose 176 (H) 70 - 99 mg/dL   CBC With Differential With Platelet    Collection Time: 24  3:28 AM   Result Value Ref Range    WBC 5.7 4.0 - 11.0 x10(3) uL    RBC 3.59 (L) 3.80 - 5.30 x10(6)uL    HGB 11.1 (L) 12.0 - 16.0 g/dL    HCT 33.0 (L) 35.0 - 48.0 %    .0 (L) 150.0 - 450.0 10(3)uL    MCV 91.9 80.0 - 100.0 fL    MCH 30.9 26.0 - 34.0 pg    MCHC 33.6 31.0 - 37.0 g/dL    RDW 14.4 %    Neutrophil Absolute Prelim 4.15 1.50 - 7.70 x10 (3) uL    Neutrophil Absolute 4.15 1.50 - 7.70 x10(3) uL    Lymphocyte Absolute 0.91 (L) 1.00 - 4.00 x10(3) uL    Monocyte Absolute 0.58 0.10 - 1.00 x10(3) uL    Eosinophil Absolute 0.00 0.00 - 0.70 x10(3) uL    Basophil Absolute 0.02 0.00 - 0.20 x10(3) uL     Immature Granulocyte Absolute 0.02 0.00 - 1.00 x10(3) uL    Neutrophil % 73.0 %    Lymphocyte % 16.0 %    Monocyte % 10.2 %    Eosinophil % 0.0 %    Basophil % 0.4 %    Immature Granulocyte % 0.4 %   Basic Metabolic Panel (8)    Collection Time: 11/02/24  3:28 AM   Result Value Ref Range    Glucose 143 (H) 70 - 99 mg/dL    Sodium 140 136 - 145 mmol/L    Potassium 3.8 3.5 - 5.1 mmol/L    Chloride 109 98 - 112 mmol/L    CO2 30.0 21.0 - 32.0 mmol/L    Anion Gap 1 0 - 18 mmol/L    BUN 11 9 - 23 mg/dL    Creatinine 0.71 0.55 - 1.02 mg/dL    Calcium, Total 9.4 8.7 - 10.4 mg/dL    Calculated Osmolality 292 275 - 295 mOsm/kg    eGFR-Cr 91 >=60 mL/min/1.73m2   POCT Glucose    Collection Time: 11/02/24  5:58 AM   Result Value Ref Range    POC Glucose 137 (H) 70 - 99 mg/dL       Assessment/Plan:  69-year-old female status post MMA embolization for subdural hematoma  She is doing well  Okay to discharge from neurosurgical standpoint  Please call with any questions or concerns  Follow-up as scheduled.  Spoke with neurocritical care    Modesto Johnson MD   [facility]  11/2/2024  10:22 AM  Dictated but not proofread

## 2024-11-02 NOTE — HOME CARE LIAISON
Received referral via [facility] for Home Health services. Spoke w/ patients JESSICA Ross who is agreeable with [facility]. Contact information placed on AVS.

## 2024-11-02 NOTE — PLAN OF CARE
Assumed pt care this am, pt is alert and oriented, BEAN equally. VSS. Up in chair and ambulating halls. Okay to discharge per providers.    Discharge paperwork printed and explained to patient and patients son at bedside. Pt discharged from unit in stable condition.

## 2024-11-04 ENCOUNTER — HOSPITAL ENCOUNTER (EMERGENCY)
Facility: HOSPITAL | Age: 69
Discharge: HOME | End: 2024-11-04
Attending: EMERGENCY MEDICINE
Payer: MEDICARE

## 2024-11-04 ENCOUNTER — APPOINTMENT (OUTPATIENT)
Dept: CT IMAGING | Facility: HOSPITAL | Age: 69
End: 2024-11-04
Attending: EMERGENCY MEDICINE
Payer: MEDICARE

## 2024-11-04 ENCOUNTER — PATIENT OUTREACH (OUTPATIENT)
Dept: CASE MANAGEMENT | Age: 69
End: 2024-11-04

## 2024-11-04 VITALS
WEIGHT: 195.13 LBS | TEMPERATURE: 97 F | HEIGHT: 62 IN | RESPIRATION RATE: 24 BRPM | DIASTOLIC BLOOD PRESSURE: 71 MMHG | BODY MASS INDEX: 35.91 KG/M2 | SYSTOLIC BLOOD PRESSURE: 149 MMHG | OXYGEN SATURATION: 98 % | HEART RATE: 62 BPM

## 2024-11-04 DIAGNOSIS — G43.809 OTHER MIGRAINE WITHOUT STATUS MIGRAINOSUS, NOT INTRACTABLE: Primary | ICD-10-CM

## 2024-11-04 DIAGNOSIS — S06.5XAA SUBDURAL HEMATOMA (HCC): ICD-10-CM

## 2024-11-04 LAB
ANION GAP SERPL CALC-SCNC: 7 MMOL/L (ref 0–18)
BASOPHILS # BLD AUTO: 0.06 X10(3) UL (ref 0–0.2)
BASOPHILS NFR BLD AUTO: 0.6 %
BUN BLD-MCNC: 9 MG/DL (ref 9–23)
CALCIUM BLD-MCNC: 9.1 MG/DL (ref 8.7–10.4)
CHLORIDE SERPL-SCNC: 101 MMOL/L (ref 98–112)
CO2 SERPL-SCNC: 29 MMOL/L (ref 21–32)
CREAT BLD-MCNC: 0.77 MG/DL
EGFRCR SERPLBLD CKD-EPI 2021: 82 ML/MIN/1.73M2 (ref 60–?)
EOSINOPHIL # BLD AUTO: 0 X10(3) UL (ref 0–0.7)
EOSINOPHIL NFR BLD AUTO: 0 %
ERYTHROCYTE [DISTWIDTH] IN BLOOD BY AUTOMATED COUNT: 14.2 %
GLUCOSE BLD-MCNC: 178 MG/DL (ref 70–99)
HCT VFR BLD AUTO: 38.4 %
HGB BLD-MCNC: 12.6 G/DL
IMM GRANULOCYTES # BLD AUTO: 0.06 X10(3) UL (ref 0–1)
IMM GRANULOCYTES NFR BLD: 0.6 %
LYMPHOCYTES # BLD AUTO: 1.22 X10(3) UL (ref 1–4)
LYMPHOCYTES NFR BLD AUTO: 11.9 %
MCH RBC QN AUTO: 31 PG (ref 26–34)
MCHC RBC AUTO-ENTMCNC: 32.8 G/DL (ref 31–37)
MCV RBC AUTO: 94.6 FL
MONOCYTES # BLD AUTO: 0.82 X10(3) UL (ref 0.1–1)
MONOCYTES NFR BLD AUTO: 8 %
NEUTROPHILS # BLD AUTO: 8.13 X10 (3) UL (ref 1.5–7.7)
NEUTROPHILS # BLD AUTO: 8.13 X10(3) UL (ref 1.5–7.7)
NEUTROPHILS NFR BLD AUTO: 78.9 %
OSMOLALITY SERPL CALC.SUM OF ELEC: 287 MOSM/KG (ref 275–295)
PLATELET # BLD AUTO: 165 10(3)UL (ref 150–450)
POTASSIUM SERPL-SCNC: 4.2 MMOL/L (ref 3.5–5.1)
RBC # BLD AUTO: 4.06 X10(6)UL
SODIUM SERPL-SCNC: 137 MMOL/L (ref 136–145)
WBC # BLD AUTO: 10.3 X10(3) UL (ref 4–11)

## 2024-11-04 PROCEDURE — 80048 BASIC METABOLIC PNL TOTAL CA: CPT | Performed by: EMERGENCY MEDICINE

## 2024-11-04 PROCEDURE — 85025 COMPLETE CBC W/AUTO DIFF WBC: CPT | Performed by: EMERGENCY MEDICINE

## 2024-11-04 PROCEDURE — 99285 EMERGENCY DEPT VISIT HI MDM: CPT

## 2024-11-04 PROCEDURE — 96375 TX/PRO/DX INJ NEW DRUG ADDON: CPT

## 2024-11-04 PROCEDURE — 96374 THER/PROPH/DIAG INJ IV PUSH: CPT

## 2024-11-04 PROCEDURE — 70496 CT ANGIOGRAPHY HEAD: CPT | Performed by: EMERGENCY MEDICINE

## 2024-11-04 RX ORDER — DIPHENHYDRAMINE HYDROCHLORIDE 50 MG/ML
25 INJECTION INTRAMUSCULAR; INTRAVENOUS ONCE
Status: COMPLETED | OUTPATIENT
Start: 2024-11-04 | End: 2024-11-04

## 2024-11-04 RX ORDER — METOCLOPRAMIDE HYDROCHLORIDE 5 MG/ML
5 INJECTION INTRAMUSCULAR; INTRAVENOUS ONCE
Status: COMPLETED | OUTPATIENT
Start: 2024-11-04 | End: 2024-11-04

## 2024-11-04 RX ORDER — DEXAMETHASONE 4 MG/1
6 TABLET ORAL ONCE
Status: COMPLETED | OUTPATIENT
Start: 2024-11-04 | End: 2024-11-04

## 2024-11-04 RX ORDER — BUTALBITAL, ACETAMINOPHEN AND CAFFEINE 50; 325; 40 MG/1; MG/1; MG/1
2 TABLET ORAL ONCE
Status: COMPLETED | OUTPATIENT
Start: 2024-11-04 | End: 2024-11-04

## 2024-11-04 RX ORDER — MORPHINE SULFATE 4 MG/ML
4 INJECTION, SOLUTION INTRAMUSCULAR; INTRAVENOUS ONCE
Status: COMPLETED | OUTPATIENT
Start: 2024-11-04 | End: 2024-11-04

## 2024-11-04 NOTE — ED INITIAL ASSESSMENT (HPI)
Pt to ER with c/o severe headache, r eye pain. \"Feel like theres a ice pick in my eye\". Denies vision problems. Reports \"I usually do have nausea but not today\". Patient a&ox4. No distress. Denies c/p/sob. Patient was recently admitted for brain bleed.

## 2024-11-04 NOTE — PROGRESS NOTES
Voicemail received; Patient daughter (POA), Shanelle returning call  Called patient's daughter (POA), Shanelle back    Specialist-Neurology appointment request (discharged 11/02)     MANUEL Scott  Neurology  [address]   [address] [address]  [phone]  Memory issues  Apt made:  Mon 11/18 @10:50am w/Dr Brooke Austin  Confirmed w/patient's daughter (POA)Shanelle  Closing encounter

## 2024-11-04 NOTE — PROGRESS NOTES
Specialist-Neurology appointment request (discharged 11/02)    MANUEL Sonia  Neurology  [phone]  Memory issues    Attempt #1:  Left message with patient's Daughter (POA), Shanelle on voicemail to call transitions specialist back to schedule follow up appointments. Provided Transitions specialist scheduling phone number [phone].

## 2024-11-05 NOTE — DISCHARGE INSTRUCTIONS
Please continue to take your medication, and try to get a good night sleep tonight.  Call Dr. Parker office in the morning to follow-up

## 2024-11-06 ENCOUNTER — TELEPHONE (OUTPATIENT)
Dept: SURGERY | Facility: CLINIC | Age: 69
End: 2024-11-06

## 2024-11-06 NOTE — TELEPHONE ENCOUNTER
Left voice mail  letting Munir Mitch know that patient will be able to see the PA on 11/18/24 at 10:15 AM  and to call back with any questions.

## 2024-11-06 NOTE — TELEPHONE ENCOUNTER
SAFETY CHECKLIST  ID Bands and Risk clasps correct and in place (DNR, Fall risk, Allergy, Latex, Limb):  Yes  All Lines Reconciled and labeled correctly: Yes  Whiteboard updated:Yes  Environmental interventions (bed/chair alarm on, call light, side rails, restraints, sitter....): Yes            Message below noted.    Pt spouse requesting to see Provider 11/18/24 as they are seeing Neurology the same day. If not they can do any other day except Friday 11/22/24.    Routed to Provider.

## 2024-11-06 NOTE — TELEPHONE ENCOUNTER
Patient called to set up 2 weeks status post MMA embolization for subdural hematoma  with PA Mi.  wanted to know if there is anyway possible wife can be seen Monday 11/18/24 since she already has an appointment on that same day with Neurology ? If not ant other day will be fine except Friday 11/22/24

## 2024-11-14 NOTE — ED PROVIDER NOTES
Patient Seen in: [facility] Emergency Department      History     Chief Complaint   Patient presents with    Post-Op    Headache     Stated Complaint: HA near right eye x 2 days. Post op - states brain surgery unsure of name    Subjective:   HPI      69-year-old patient presents to the emergency department with family with a headache behind her right eye for the past 2 days.  She states that the pain has been severe at times and she return to the emergency department with her  because she has recently had brain surgery.  I reviewed the patient's chart because she was not really able to adequately answer some of these questions and it appears the patient had a subdural hematoma that was treated with a middle meningeal artery embolization and then she was admitted to the neurosurgical ICU.  She actually had a acute and chronic subdural at that time and it was unclear exactly how old that was because it was sort noticed by her psychiatrist on the CT read as an outpatient and then she was directed to the hospital.  She was discharged 2 days prior to her representation to the ER.  She states that she has been having a headache near her right eye basically since leaving the hospital.  She was given Fioricet for headaches at home.  Upon my independent chart review she was actually receiving 10 mg of Percocet every 6 hours for pain while in the hospital for the headache    Objective:     Past Medical History:    Anxiety    situational    Back problem    Cataract    Degeneration of lumbar or lumbosacral intervertebral disc    Resolved since last addressed 10/31/19    Depression    Diabetes (HCC)    DM (diabetes mellitus), type 2 (HCC)    Hearing impairment    [place]; uses bilateral hearing aids    Hepatic encephalopathy (HCC)    High blood pressure    High cholesterol    HPV (human papilloma virus) infection    HYPOTHYROIDISM    Incontinence    Liver cirrhosis secondary to PRETTY (HCC)    Cirrhosis from the CT on US  [place].  Grade 2 non bleeding varices. Hx HE.  No ascites    Osteoarthritis    Other and unspecified hyperlipidemia    Pulmonary embolism (HCC)    Left lung    Shortness of breath    with activity during hot weather    Sleep apnea    not using CPAP anymore    Trochanteric bursitis    Resolved since last addressed 13    Unspecified essential hypertension    Visual impairment    glasses/contact lenses              Past Surgical History:   Procedure Laterality Date    Appendectomy  10-23-12 [facility]    Biopsy of breast, incisional            Colonoscopy  19= Diverticulosis, Polyps (TA), Hemorrhoids    Repeat     Colonoscopy N/A 2022    Procedure: COLONOSCOPY;  Surgeon: Salbador Mann MD;  Location: [facility] ENDOSCOPY    Colonoscopy,biopsy  9/15/2011    Performed by SALBADOR MANN at [facility] [place]    Colposcopy,bx cervix/endocerv curr      Egd N/A 2019    Salbador Mann MD;  Grade 2 varices, Gastritis.  Gastric/SB Bx: Normal    Excisional biopsy left      left axilla    Laminectomy,lumbar      Needle biopsy left  1988    Needle biopsy right      Other      Tummy tuck    Other surgical history      LOWER BACK SURGERY-DISC    Tonsillectomy      Us elasto liver parenchyma (ryk=[id number])  19: F-4 cirrhosis                Social History     Socioeconomic History    Marital status: Single   Tobacco Use    Smoking status: Former     Current packs/day: 0.00     Average packs/day: 0.1 packs/day for 28.0 years (2.8 ttl pk-yrs)     Types: Cigarettes     Start date: 1983     Quit date: 2011     Years since quittin.8    Smokeless tobacco: Former   Vaping Use    Vaping status: Never Used   Substance and Sexual Activity    Alcohol use: Not Currently    Drug use: No    Sexual activity: Not Currently     Partners: Male     Social Drivers of Health     Food Insecurity: No Food Insecurity (10/30/2024)    Food Insecurity     Food Insecurity: Never true    Transportation Needs: No Transportation Needs (10/30/2024)    Transportation Needs     Lack of Transportation: No   Housing Stability: Low Risk  (10/30/2024)    Housing Stability     Housing Instability: No                  Physical Exam     ED Triage Vitals [11/04/24 1344]   BP (!) 161/80   Pulse 55   Resp 18   Temp 96.8 °F (36 °C)   Temp src Temporal   SpO2 92 %   O2 Device None (Room air)       Current Vitals:   No data recorded    Physical Exam  Pleasant but forgetful 69-year-old woman lying on an emergency department bed her  is at bedside.  She is awake speaking in full and complete sentences she is smiling and interactive but very forgetful with noted memory lapses.  Her HEENT exam reveals pupils are equal round reactive to light her extraocular movements are intact she does not have any scars commensurate with her recent craniotomy.  Her neck is supple and nontender to palpation lungs are clear to auscultation heart has a regular rate and rhythm abdomen is soft nontender not distended and she is moving all extremities.  Her  is seated at bedside    ED Course     Labs Reviewed   CBC WITH DIFFERENTIAL WITH PLATELET - Abnormal; Notable for the following components:       Result Value    Neutrophil Absolute Prelim 8.13 (*)     Neutrophil Absolute 8.13 (*)     All other components within normal limits   BASIC METABOLIC PANEL (8) - Abnormal; Notable for the following components:    Glucose 178 (*)     All other components within normal limits   RAINBOW DRAW LAVENDER   RAINBOW DRAW LIGHT GREEN   RAINBOW DRAW BLUE     CTA BRAIN (CPT=70496)   Final Result   Addendum (preliminary) 1 of 1   ADDENDUM:        CT angiographic images reveal no focal vascular lesions within the brain.     3D reconstructed images of the intracranial arterial system were requested    by the radiologist and performed by the technologist.  No focal lesions on    the reconstructed images are    noted.       Dictated by (CST):  Shyam Horvath DO on 11/06/2024 at 8:03 AM        Finalized by (CST): Shyam Horvath DO on 11/06/2024 at 8:04 AM                           Final   PROCEDURE:  CTA BRAIN (CPT=70496)       COMPARISON:  [facility] , CT, CT BRAIN OR HEAD (72835), 10/30/2024, 5:05 PM.       INDICATIONS:  Patient presents with severe right periorbital headache and    right orbital pain.  Associated blurry vision for 2 weeks.       TECHNIQUE:  Unenhanced followed by contrast enhanced multislice CT    angiography of the brain vasculature using non-ionic contrast. Multiplanar    3D reformatted imaging as well as multiplanar MIP images were obtained.    Dose reduction techniques were used.    Dose information is transmitted to the ACR (American College of Radiology)    NRDR (National Radiology Data Registry) which includes the Dose Index    Registry.       PATIENT STATED HISTORY:(As transcribed by Technologist)  Patient c/o    severe headache, right eye pain, and blurry vision for the past 2 weeks.         CONTRAST USED:  60cc of Isovue 370       FINDINGS:     There is again evidence of an acute on chronic right-sided subdural    hematoma which is relatively stable to slightly decreased in size from the    previous exam, currently measuring up to 1.1 cm in maximal thickness,    previously measuring up to 1.4 cm.  No    new foci of acute intracranial hemorrhage.  There is no midline shift.     There is minimal mass effect along the right cerebral convexity which is    decreased from prior imaging.       Stable mild diffuse atrophy and white matter disease.       Post-contrast imaging reveals no abnormal post-contrast enhancement within    the brain parenchyma.  No evidence of active extravasation of contrast    into the right-sided subdural hematoma.       The ventricles and sulci are slightly prominent due to mild atrophy and    white matter disease.  There is a small air-fluid level within the right    maxillary sinus suspicious for mild  sinusitis.                             =====   CONCLUSION:     1. Acute on chronic right-sided subdural hematoma is stable to slightly    decreased in size from the previous exam.   2. No new areas of intracranial hemorrhage.   3. Stable atrophy and white matter disease.   4. No foci of abnormal postcontrast enhancement.   5. Small air-fluid level within the right maxillary sinus suspicious for    sinusitis.           LOCATION:  [facility]           Dictated by (CST): Shyam Horvath DO on 11/04/2024 at 6:44 PM        Finalized by (CST): Shyam Horvath DO on 11/04/2024 at 6:49 PM                         MDM      Pleasant 69-year-old who had acute and chronic subdural hematoma causing headaches and apparently concern for altered mental status who was admitted underwent embolization of cerebral vessels and was discharged 2 days ago presents to the emergency department with  with concern for worsening headache for the past 2 days.  Differential diagnosis includes but is not limited to increased bleeding in the brain, it could be because patient has had a significant change in her pain medication regimen as she was taking 2 Percocet at a time every 6 hours while in the hospital and was discharged home on Fioricet.  She states the pain is pretty severe it is behind the right eye and it signs migrainous in nature now the patient has a history of migraine headaches but that is basically what she presented to the emergency department for after she was seen by the psychiatrist when she had the outpatient CT that led to the diagnosis of the acute subdural.  Therefore I would be concerned that she possibly has more bleeding in her brain.  Discussed this with the patient and  expressed understanding migraine cocktail given including stronger narcotic pain medication as the patient has been on for some time.  CTA of the brain was done and did consult neurosurgery given presentation.  Thankfully CT of the brain does not  show any signs of acute bleeding it just has the chronic presentation from previous that is reassuring.  Patient's headache slightly improving in the emergency department discussed that we may need to place her on stronger pain medication and she did express understanding to that along with her .  Increased bleeding has been evaluated and ruled out headaches may be ongoing as the patient remains having a subdural hematoma.        Medical Decision Making      Disposition and Plan     Clinical Impression:  1. Other migraine without status migrainosus, not intractable    2. Subdural hematoma (HCC)         Disposition:  Discharge  11/4/2024  8:14 pm    Follow-up:  Demarcus Mcrae MD  [address] [facility] [facility]  [address]  [facility] [id number] [phone]    Follow up      Fransisco Lamar MD  [address] , [address] [id number] [phone]    Schedule an appointment as soon as possible for a visit            Medications Prescribed:  Discharge Medication List as of 11/4/2024  8:57 PM              Supplementary Documentation:

## 2024-11-18 ENCOUNTER — OFFICE VISIT (OUTPATIENT)
Dept: SURGERY | Facility: CLINIC | Age: 69
End: 2024-11-18
Payer: MEDICARE

## 2024-11-18 VITALS — SYSTOLIC BLOOD PRESSURE: 126 MMHG | DIASTOLIC BLOOD PRESSURE: 78 MMHG | HEART RATE: 62 BPM

## 2024-11-18 DIAGNOSIS — I62.03 CHRONIC SUBDURAL HEMATOMA (HCC): Primary | ICD-10-CM

## 2024-11-18 NOTE — PATIENT INSTRUCTIONS
Refill policies:    Allow 2-3 business days for refills; controlled substances may take longer.  Contact your pharmacy at least 5 days prior to running out of medication and have them send an electronic request or submit request through the “request refill” option in your [employer] account.  Refills are not addressed on weekends; covering physicians do not authorize routine medications on weekends.  No narcotics or controlled substances are refilled after noon on Fridays or by on call physicians.  By law, narcotics must be electronically prescribed.  A 30 day supply with no refills is the maximum allowed.  If your prescription is due for a refill, you may be due for a follow up appointment.  To best provide you care, patients receiving routine medications need to be seen at least once a year.  Patients receiving narcotic/controlled substance medications need to be seen at least once every 3 months.  In the event that your preferred pharmacy does not have the requested medication in stock (e.g. Backordered), it is your responsibility to find another pharmacy that has the requested medication available.  We will gladly send a new prescription to that pharmacy at your request.    Scheduling Tests:    If your physician has ordered radiology tests such as MRI or CT scans, please contact Central Scheduling at [phone] right away to schedule the test.  Once scheduled, the [facility] Centralized Referral Team will work with your insurance carrier to obtain pre-certification or prior authorization.  Depending on your insurance carrier, approval may take 3-10 days.  It is highly recommended patients assure they have received an authorization before having a test performed.  If test is done without insurance authorization, patient may be responsible for the entire amount billed.      Precertification and Prior Authorizations:  If your physician has recommended that you have a procedure or additional testing performed the [facility]  Centralized Referral Team will contact your insurance carrier to obtain pre-certification or prior authorization.    You are strongly encouraged to contact your insurance carrier to verify that your procedure/test has been approved and is a COVERED benefit.  Although the [facility] Centralized Referral Team does its due diligence, the insurance carrier gives the disclaimer that \"Although the procedure is authorized, this does not guarantee payment.\"    Ultimately the patient is responsible for payment.   Thank you for your understanding in this matter.  Paperwork Completion:  If you require FMLA or disability paperwork for your recovery, please make sure to either drop it off or have it faxed to our office at [phone]. Be sure the form has your name and date of birth on it.  The form will be faxed to our Forms Department and they will complete it for you.  There is a 25$ fee for all forms that need to be filled out.  Please be aware there is a 10-14 day turnaround time.  You will need to sign a release of information (JES) form if your paperwork does not come with one.  You may call the Forms Department with any questions at [phone].  Their fax number is [phone].

## 2024-11-18 NOTE — PROGRESS NOTES
[facility]  Neurological Surgery Clinic Note    Ivanna Meyer  7/8/1955  [id number]  PCP: Demarcus Mcrae MD    REASON FOR VISIT:  Hospital follow up s/p MMA embolization  Right SDH    HISTORY OF PRESENT ILLNESS:  Ivanna Meyer is a 69 year old female with PMH T2DM, HTN, HLD, hypothyroidism, hepatic encephalopathy and liver cirrhosis secondary to PRETTY, PE presenting with HA and nausea who presented to [facility] on 10/30/24. . Of note a couple months ago she was admitted for falls and contusion. Her daughter over the phone reports a month ago the patient was having more falls and worsening confusion. Over the last 1-2 weeks she had been improving with her balance and falls but was having a persistent HA for which she presented to ED for this admission. CT head showed a chronic SDH. She does not take any AC/AP. She had a Right MMA embolization on 11/1/24. On 11/2/24 she was DC home. She then presented to the ED on 11/4/24 with HA. CTA H/N on 11/4/24 shows slightly diminished size of SDH. She presents today as a follow up.     Patient presents with her caregiver. Patient reports that she continues to have headaches. Has had headaches over her right eye. Was prescribed [place] by her PCP. She reports daily headaches. Patient c/o cognitive difficulty. Yesterday, she thought it was going to be Easter instead of Thanksgiving. Reports a slow cognitive decline. Patient does not drive anymore. States that her children asked her not to drive.        PAST MEDICAL HISTORY:  Past Medical History:    Anxiety    situational    Back problem    Cataract    Degeneration of lumbar or lumbosacral intervertebral disc    Resolved since last addressed 10/31/19    Depression    Diabetes (HCC)    DM (diabetes mellitus), type 2 (HCC)    Hearing impairment    [place]; uses bilateral hearing aids    Hepatic encephalopathy (HCC)    High blood pressure    High cholesterol    HPV (human papilloma virus)  infection    HYPOTHYROIDISM    Incontinence    Liver cirrhosis secondary to PRETTY (HCC)    Cirrhosis from the CT on [place].  Grade 2 non bleeding varices. Hx HE.  No ascites    Osteoarthritis    Other and unspecified hyperlipidemia    Pulmonary embolism (HCC)    Left lung    Shortness of breath    with activity during hot weather    Sleep apnea    not using CPAP anymore    Trochanteric bursitis    Resolved since last addressed 13    Unspecified essential hypertension    Visual impairment    glasses/contact lenses       PAST SURGICAL HISTORY:  Past Surgical History:   Procedure Laterality Date    Appendectomy  10-23-12 [facility]    Biopsy of breast, incisional            Colonoscopy  19= Diverticulosis, Polyps (TA), Hemorrhoids    Repeat     Colonoscopy N/A 2022    Procedure: COLONOSCOPY;  Surgeon: Salbador Mann MD;  [facility] END[facility]COPY    Colonoscopy,biopsy  9/15/2011    Performed by SALBADOR MANN [facility] [place]    Colposcopy,bx cervix/endocerv curr      Egd N/A 2019    Salbador Mann MD;  Grade 2 varices, Gastritis.  Gastric/SB Bx: Normal    Excisional biopsy left      left axilla    Laminectomy,lumbar      Needle biopsy left  1988    Needle biopsy right      Other      Tummy tuck    Other surgical history      LOWER BACK SURGERY-DISC    Tonsillectomy      Us elasto liver parenchyma (crs=[id number])  19: F-4 cirrhosis       FAMILY HISTORY:  family history includes Cancer in her father and sister; Diabetes in her mother; Heart Disease in her father and mother; Heart Disorder in her father and mother; Hypertension in her mother; hysterectomy in her sister.    SOCIAL HISTORY:   reports that she quit smoking about 13 years ago. Her smoking use included cigarettes. She started smoking about 41 years ago. She has a 2.8 pack-year smoking history. She has quit using smokeless tobacco. She reports that she does not currently use alcohol. She  reports that she does not use drugs.    ALLERGIES:  Allergies[1]    MEDICATIONS:  Medications Ordered Prior to Encounter[2]    REVIEW OF SYSTEMS:  A 10-point system was reviewed.  Pertinent positives and negatives are noted in HPI.      PHYSICAL EXAMINATION:  VITAL SIGNS: LMP 11/04/2009     A&Ox3, no acute distress  PERRL, EOMi, FS, TM  Full strength x 4, no drift  Sensation intact       Imaging Review:  CTA BRAIN (CPT=70496)    Addendum Date: 11/6/2024    ADDENDUM:  CT angiographic images reveal no focal vascular lesions within the brain.  3D reconstructed images of the intracranial arterial system were requested by the radiologist and performed by the technologist.  No focal lesions on the reconstructed images are noted.  Dictated by (CST): Shyam Horvath DO on 11/06/2024 at 8:03 AM     Finalized by (CST): Shyam Horvath DO on 11/06/2024 at 8:04 AM             Result Date: 11/6/2024  CONCLUSION:  1. Acute on chronic right-sided subdural hematoma is stable to slightly decreased in size from the previous exam. 2. No new areas of intracranial hemorrhage. 3. Stable atrophy and white matter disease. 4. No foci of abnormal postcontrast enhancement. 5. Small air-fluid level within the right maxillary sinus suspicious for sinusitis.   LOCATION:  [facility]   Dictated by (CST): Shyam Horvath DO on 11/04/2024 at 6:44 PM     Finalized by (CST): Shyam Horvath DO on 11/04/2024 at 6:49 PM         ASSESSMENT:  68 yo female with right chronic SDH s/p MMA     Plan:  No further scans   Offered follow up with Neurology for Headaches and cognitive  Follow up with Dr Austin    We reviewed stroke symptoms and when to go to the ED. The patient was instructed to continue healthy lifestyle habits to include not smoking, managing blood pressure, healthy eating and regular exercise.    All questions were answered and the patient was instructed to call or message with any additional questions or concerns.     ALFONSO Benavides,  [facility]  Neurological Surgery  [facility]    Care Time: 30 min including face to face time, chart review, imaging interpretation, and coordination of care          [1]   Allergies  Allergen Reactions    Amoxicillin-Na Benzoate ANAPHYLAXIS    Pcn [Penicillins] HIVES    Lisinopril Coughing   [2]   Current Outpatient Medications on File Prior to Visit   Medication Sig Dispense Refill    pantoprazole 40 MG Oral Tab EC Take 1 tablet (40 mg total) by mouth every morning before breakfast. 30 tablet 0    butalbital-acetaminophen-caffeine -40 MG Oral Tab Take 1-2 tablets by mouth every 6 (six) hours as needed for Pain. (Patient not taking: Reported on 11/4/2024) 20 tablet 0    gabapentin 300 MG Oral Cap Take 1 capsule (300 mg total) by mouth 3 (three) times daily.      buPROPion  MG Oral Tablet 24 Hr Take 1 tablet (150 mg total) by mouth daily.      rosuvastatin 5 MG Oral Tab Take 1 tablet (5 mg total) by mouth nightly.      MILK THISTLE OR Take by mouth.      glimepiride 2 MG Oral Tab Take 1 tablet (2 mg total) by mouth daily with breakfast.      zolpidem 5 MG Oral Tab Take 1 tablet (5 mg total) by mouth nightly as needed. 10 tablet 0    oxyCODONE-acetaminophen 5-325 MG Oral Tab Take 1-2 tablets by mouth every 6 (six) hours as needed for Pain. 15 tablet 0    lactulose 10 GM/15ML Oral Solution Take 30 mL (20 g total) by mouth 3 (three) times daily. 2700 mL 1    SERTRALINE 100 MG Oral Tab TAKE 1 TABLET BY MOUTH EVERY DAY 90 tablet 0    PROPRANOLOL 10 MG Oral Tab TAKE 1 TABLET BY MOUTH THREE TIMES A  tablet 0    ACCU-CHEK FASTCLIX LANCETS Does not apply Misc Once daily 100 each 3    METFORMIN HCL 1000 MG Oral Tab TAKE 1 TABLET BY MOUTH TWICE A  tablet 0    Glucose Blood (ACCU-CHEK GIANA) In Vitro Strip Use one strip to test blood sugar once daily.  Diagnosed E11.9 100 strip 3    Multiple Vitamin (MULTI-VITAMIN DAILY) Oral Tab Take by mouth daily.        Cholecalciferol (VITAMIN D) 50 MCG  (2000 UT) Oral Cap Take by mouth daily.        Accu-Chek Softclix Lancets Does not apply Misc Use to test blood glucose daily. 100 each 0     No current facility-administered medications on file prior to visit.

## 2025-01-24 PROBLEM — D69.6 THROMBOCYTOPENIA: Chronic | Status: ACTIVE | Noted: 2025-01-24

## 2025-01-24 PROBLEM — E66.01 SEVERE OBESITY (BMI 35.0-39.9) WITH COMORBIDITY (HCC): Chronic | Status: ACTIVE | Noted: 2025-01-24

## 2025-05-23 ENCOUNTER — TELEPHONE (OUTPATIENT)
Dept: NEUROLOGY | Facility: CLINIC | Age: 70
End: 2025-05-23

## (undated) DEVICE — KIT VLV 5 PC AIR H2O SUCT BX ENDOGATOR CONN

## (undated) DEVICE — STERILE POLYISOPRENE POWDER-FREE SURGICAL GLOVES: Brand: PROTEXIS

## (undated) DEVICE — BSS BAG CENTURION

## (undated) DEVICE — BURR SHVR COOLCUT 13CM 5MM 8

## (undated) DEVICE — BLADE ADVANCUT CORNEAL 2.4MM

## (undated) DEVICE — CUTTER BN CUT COOLCUT 13CM 5.5

## (undated) DEVICE — NEEDLE CYSTOTOME W/25G CANNULA

## (undated) DEVICE — CLEARCUT® SIDEPORT KNIFE DUAL BEVEL 1.0MM ANGLED: Brand: CLEARCUT®

## (undated) DEVICE — SOLUTION  .9 3000ML

## (undated) DEVICE — TUBING IRR 16FT CNT WV 3 ASCP

## (undated) DEVICE — CATARACT PATIENT CARE KIT

## (undated) DEVICE — GAUZE SPONGES,12 PLY: Brand: CURITY

## (undated) DEVICE — 3M™ RED DOT™ MONITORING ELECTRODE WITH FOAM TAPE AND STICKY GEL, 50/BAG, 20/CASE, 72/PLT 2570: Brand: RED DOT™

## (undated) DEVICE — SUT VICRYL 3-0 PS-2 J497G

## (undated) DEVICE — SUT ETHILON 3-0 PS-2 1669H

## (undated) DEVICE — CANNULA 7MM TWIST AR-6570

## (undated) DEVICE — CANNULA 7MM W/LIP AR-6550

## (undated) DEVICE — GAMMEX® PI HYBRID SIZE 9, STERILE POWDER-FREE SURGICAL GLOVE, POLYISOPRENE AND NEOPRENE BLEND: Brand: GAMMEX

## (undated) DEVICE — GIJAW SINGLE-USE BIOPSY FORCEPS WITH NEEDLE: Brand: GIJAW

## (undated) DEVICE — COVER STND 54X23IN MAYO REINF

## (undated) DEVICE — ELECTRODE VALLEYLAB 10

## (undated) DEVICE — 10FT COMBINED O2 DELIVERY/CO2 MONITORING. FILTER WITH MICROSTREAM TYPE LUER: Brand: DUAL ADULT NASAL CANNULA

## (undated) DEVICE — PREP BETADINE SOL 5% EYE

## (undated) DEVICE — AMBIENT SUPER TURBOVAC 90 IFS: Brand: COBLATION

## (undated) DEVICE — ACTIVE FMS W/ INTREPID* ULTRA SLEEVES, 0.9MM 30° ABS* INTREPID* BALANCED TIP: Brand: ALCON

## (undated) DEVICE — 1200CC GUARDIAN II: Brand: GUARDIAN

## (undated) DEVICE — MEDI-VAC NON-CONDUCTIVE SUCTION TUBING 6MM X 1.8M (6FT.) L: Brand: CARDINAL HEALTH

## (undated) DEVICE — ADAPTER AIR H2O CLN DISP FOR OLY GI E BIOGRD

## (undated) DEVICE — BLADE SHVR 13CM 4MM EXCLBR

## (undated) DEVICE — SHOULDER ARTHROSCOPY: Brand: MEDLINE INDUSTRIES, INC.

## (undated) DEVICE — KIT TRC TRIMANO BEACH CHR ARM

## (undated) DEVICE — TUBING DW OUTFLOW

## (undated) DEVICE — EXPRESSEW III SUTURE NEEDLE FOR USE WITH EXPRESSEW II OR III SUTURE PASSER: Brand: EXPRESSEW

## (undated) DEVICE — STERIS KITS

## (undated) DEVICE — SOL H2O 1000ML BTL

## (undated) DEVICE — WRAP COOLING SHLDR W/ICE PILLO

## (undated) DEVICE — FLEXIBLE YANKAUER,MEDIUM TIP, NO VACUUM CONTROL: Brand: ARGYLE

## (undated) DEVICE — KIT CLEAN ENDOKIT 1.1OZ GOWNX2

## (undated) DEVICE — BITEBLOCK ENDOSCP 60FR MAXI STRP

## (undated) DEVICE — EYE PACK: Brand: MEDLINE INDUSTRIES, INC.

## (undated) DEVICE — T-MAX DISPOSABLE FACE MASK 8 PER BOX

## (undated) DEVICE — GAMMEX® PI HYBRID SIZE 7.5, STERILE POWDER-FREE SURGICAL GLOVE, POLYISOPRENE AND NEOPRENE BLEND: Brand: GAMMEX

## (undated) DEVICE — GOWN SURG AERO BLUE PERF XXXLG

## (undated) DEVICE — UNFOLDER PLATINUM 1 SERIES CRTRDG 30/BOX: Brand: UNFOLDER PLATINUM 1 SERIES

## (undated) DEVICE — BANDAGE ROLL,100% COTTON, 6 PLY, LARGE: Brand: KERLIX

## (undated) DEVICE — APPLICATOR CHLORAPREP 26ML

## (undated) DEVICE — SUT MONOCRYL 3-0 PS-1 Y936H

## (undated) DEVICE — UNDYED BRAIDED (POLYGLACTIN 910), SYNTHETIC ABSORBABLE SUTURE: Brand: COATED VICRYL

## (undated) DEVICE — Device: Brand: DUAL NARE NASAL CANNULAE FEMALE LUER CON 7FT O2 TUBE

## (undated) DEVICE — SINGLE USE MEDICAL DEVICE FOR OPHTHALMIC SURGERY: Brand: SIL. COATED I/A 45 MIL 12/B

## (undated) DEVICE — SUT ETHILON 3-0 FS-1 669H

## (undated) DEVICE — 60 ML SYRINGE REGULAR TIP: Brand: MONOJECT

## (undated) DEVICE — CANNULA 5.75 CLEAR AR-6560

## (undated) DEVICE — KIT ENDO ORCAPOD 160/180/190

## (undated) DEVICE — NEEDLE SCORPION AR-13995N

## (undated) DEVICE — MEDI-VAC NON-CONDUCTIVE SUCTION TUBING: Brand: CARDINAL HEALTH

## (undated) DEVICE — MEGADYNE EZ CLEAN BLADE 2.75IN

## (undated) DEVICE — FORCEP RADIAL JAW 4

## (undated) DEVICE — GAMMEX® PI HYBRID SIZE 7, STERILE POWDER-FREE SURGICAL GLOVE, POLYISOPRENE AND NEOPRENE BLEND: Brand: GAMMEX

## (undated) NOTE — LETTER
[address]  Authorization for Invasive Procedure                                                                                           1. I hereby authorize Angie Davies MD, my physician and his/her assistants (if applicable), which may include medical students, residents, and/or fellows, to perform the following surgical operation/ procedure and administer such anesthesia as may be determined necessary by my physician: Operation/Procedure name (s) COLONOSCOPY on Marky Farmer   2. I recognize that during the surgical operation/procedure, unforeseen conditions may necessitate additional or different procedures than those listed above. I, therefore, further authorize and request that the above-named surgeon, assistants, or designees perform such procedures as are, in their judgment, necessary and desirable. 3.   My surgeon/physician has discussed prior to my surgery the potential benefits, risks and side effects of this procedure; the likelihood of achieving goals; and potential problems that might occur during recuperation. They also discussed reasonable alternatives to the procedure, including risks, benefits, and side effects related to the alternatives and risks related to not receiving this procedure. I have had all my questions answered and I acknowledge that no guarantee has been made as to the result that may be obtained. 4.   Should the need arise during my operation/procedure, which includes change of level of care prior to discharge, I also consent to the administration of blood and/or blood products. Further, I understand that despite careful testing and screening of blood or blood products by collecting agencies, I may still be subject to ill effects as a result of receiving a blood transfusion and/or blood products.   The following are some, but not all, of the potential risks that can occur: fever and allergic reactions, hemolytic reactions, transmission of diseases such as Hepatitis, AIDS and Cytomegalovirus (CMV) and fluid overload. In the event that I wish to have an autologous transfusion of my own blood, or a directed donor transfusion, I will discuss this with my physician. Check only if Refusing Blood or Blood Products  I understand refusal of blood or blood products as deemed necessary by my physician may have serious consequences to my condition to include possible death. I hereby assume responsibility for my refusal and release the hospital, its personnel, and my physicians from any responsibility for the consequences of my refusal.    o  Refuse   5. I authorize the use of any specimen, organs, tissues, body parts or foreign objects that may be removed from my body during the operation/procedure for diagnosis, research or teaching purposes and their subsequent disposal by hospital authorities. I also authorize the release of specimen test results and/or written reports to my treating physician on the hospital medical staff or other referring or consulting physicians involved in my care, at the discretion of the Pathologist or my treating physician. 6.   I consent to the photographing or videotaping of the operations or procedures to be performed, including appropriate portions of my body for medical, scientific, or educational purposes, provided my identity is not revealed by the pictures or by descriptive texts accompanying them. If the procedure has been photographed/videotaped, the surgeon will obtain the original picture, image, videotape or CD. The hospital will not be responsible for storage, release or maintenance of the picture, image, tape or CD.    7.   I consent to the presence of a  or observers in the operating room as deemed necessary by my physician or their designees.     8.   I recognize that in the event my procedure results in extended X-Ray/fluoroscopy time, I may develop a skin reaction. 9. If I have a Do Not Attempt Resuscitation (DNAR) order in place, that status will be suspended while in the operating room, procedural suite, and during the recovery period unless otherwise explicitly stated by me (or a person authorized to consent on my behalf). The surgeon or my attending physician will determine when the applicable recovery period ends for purposes of reinstating the DNAR order. 10. Patients having a sterilization procedure: I understand that if the procedure is successful the results will be permanent and it will therefore be impossible for me to inseminate, conceive, or bear children. I also understand that the procedure is intended to result in sterility, although the result has not been guaranteed. 11. I acknowledge that my physician has explained sedation/analgesia administration to me including the risk and benefits I consent to the administration of sedation/analgesia as may be necessary or desirable in the judgment of my physician. I CERTIFY THAT I HAVE READ AND FULLY UNDERSTAND THE ABOVE CONSENT TO OPERATION and/or OTHER PROCEDURE.     _________________________________________ _________________________________     ___________________________________  Signature of Patient     Signature of Responsible Person                   Printed Name of Responsible Person                              _________________________________________ ______________________________        ___________________________________  Signature of Witness         Date  Time         Relationship to Patient    STATEMENT OF PHYSICIAN My signature below affirms that prior to the time of the procedure; I have explained to the patient and/or his/her legal representative, the risks and benefits involved in the proposed treatment and any reasonable alternative to the proposed treatment.  I have also explained the risks and benefits involved in refusal of the proposed treatment and alternatives to the proposed treatment and have answered the patient's questions.  If I have a significant financial interest in a co-management agreement or a significant financial interest in any product or implant, or other significant relationship used in this procedure/surgery, I have disclosed this and had a discussion with my patient.     _______________________________________________________________ _____________________________  (Signature of Physician)                                                                                         (Date)                                   (Time)  Patient Name: Ashkan July    : 1955   Printed: 2022      Medical Record #: [id number]                                              Page 1 of 1

## (undated) NOTE — LETTER
[address]  [address]    Consent for Anesthesia    I, Ivanna Meyer agree to be cared for by a physician anesthesiologist alone and/or with a nurse anesthetist, who is specially trained to monitor me and give me medicine to put me to sleep or keep me comfortable during my procedure    I understand that my anesthesiologist and/or anesthetist is not an employee or agent of [facility] or [employer]. He or she works for [employer].    As the patient asking for anesthesia services, I agree to:  Allow the anesthesiologist (anesthesia doctor) to give me medicine and do additional procedures as necessary. Some examples are: Starting or using an “IV” to give me medicine, fluids or blood during my procedure, and having a breathing tube placed to help me breathe when I’m asleep (intubation). In the event that my heart stops working properly, I understand that my anesthesiologist will make every effort to sustain my life, unless otherwise directed by [facility] Do Not Resuscitate documents.  Tell my anesthesia doctor before my procedure:   If I am pregnant.   The last time that I ate or drank.  iii. All of the medicines I take (including prescriptions, herbal supplements, and pills I can buy without a prescription (including street drugs/illegal medications). Failure to inform my anesthesiologist about these medicines may increase my risk of anesthetic complications.  Iv.If I am allergic to anything or have had a reaction to anesthesia before.  I understand how the anesthesia medicine will help me (benefits).  I understand that with any type of anesthesia medicine there are risks:  The most common risks are: nausea, vomiting, sore throat, muscle soreness, damage to my eyes, mouth, or teeth (from breathing tube placement).  Rare risks include: remembering what happened during my procedure, allergic reactions to medications, injury to my  airway, heart, lungs, vision, nerves, or muscles and in extremely rare instances death.  My doctor has explained to me other choices available to me for my care (alternatives).  Pregnant Patients (“epidural”):  I understand that the risks of having an epidural (medicine given into my back to help control pain during labor), include itching, low blood pressure, difficulty urinating, headache or slowing of the baby’s heart. Very rare risks include infection, bleeding, seizure, irregular heart rhythms and nerve injury.  Regional Anesthesia (“spinal”, “epidural”, & “nerve blocks”):  I understand that rare but potential complications include headache, bleeding, infection, seizure, irregular heart rhythms, and nerve injury.    _____________________________________________________________________________  Patient (or Representative) Signature/Relationship to Patient  Date   Time    _____________________________________________________________________________   Name (if used)    Language/Organization   Time    _____________________________________________________________________________  Nurse Anesthetist Signature     Date   Time    ______________________________________________________________________________  Anesthesiologist Signature     Date   Time  I have discussed the procedure and information above with the patient (or patient’s representative) and answered their questions. The patient or their representative has agreed to have anesthesia services.    _____________________________________________________________________________  Witness       Date   Time  I have verified that the signature is that of the patient or patient’s representative, and that it was signed before the procedure    Patient Name: Ivanna Meyer     : 1955                 Printed: 2024 at 7:36 AM    Medical Record #: [id number]                                            Page 1 of 1

## (undated) NOTE — LETTER
To Whom it May Concern,      Kayden Patricio Was hospitalized at [facility].    Stable to Return to Work on 3/20/17    Sincerely,    Génesis Jackson MD  [facility] hospitalist

## (undated) NOTE — LETTER
[address] [place] [address]      Authorization for Surgical Operation and Procedure     Date:___________                                                                                                         Time:__________  1. I hereby Kodi Song MD, my physician and his/her assistants (if applicable), which may include medical students, residents, and/or fellows, to perform the following surgical operation/ procedure and administer such anesthesia as may be determined necessary by my physician:  Operation/Procedure name (s) Left shoulder arthroscopy with debridement, subacromial decompression with acromioplasty, rotator cuff repair and arthroscopic versus open biceps tenodesis  on [facility]   2. I recognize that during the surgical operation/procedure, unforeseen conditions may necessitate additional or different procedures than those listed above. I, therefore, further authorize and request that the above-named surgeon, assistants, or designees perform such procedures as are, in their judgment, necessary and desirable. 3.   My surgeon/physician has discussed prior to my surgery the potential benefits, risks and side effects of this procedure; the likelihood of achieving goals; and potential problems that might occur during recuperation. They also discussed reasonable alternatives to the procedure, including risks, benefits, and side effects related to the alternatives and risks related to not receiving this procedure. I have had all my questions answered and I acknowledge that no guarantee has been made as to the result that may be obtained. 4.   Should the need arise during my operation or immediate post-operative period, I also consent to the administration of blood and/or blood products.   Further, I understand that despite careful testing and screening of blood or blood products by collecting agencies, I may still be subject to ill effects as a result of receiving a blood transfusion and/or blood products. The following are some, but not all, of the potential risks that can occur: fever and allergic reactions, hemolytic reactions, transmission of diseases such as Hepatitis, AIDS and Cytomegalovirus (CMV) and fluid overload. In the event that I wish to have an autologous transfusion of my own blood, or a directed donor transfusion. I will discuss this with my physician. 5.   I authorize the use of any specimen, organs, tissues, body parts or foreign objects that may be removed from my body during the operation/procedure for diagnosis, research or teaching purposes and their subsequent disposal by hospital authorities. I also authorize the release of specimen test results and/or written reports to my treating physician on the hospital medical staff or other referring or consulting physicians involved in my care, at the discretion of the Pathologist or my treating physician. 6.   I consent to the photographing or videotaping of the operations or procedures to be performed, including appropriate portions of my body for medical, scientific, or educational purposes, provided my identity is not revealed by the pictures or by descriptive texts accompanying them. If the procedure has been photographed/videotaped, the surgeon will obtain the original picture, image, videotape or CD. The hospital will not be responsible for storage, release or maintenance of the picture, image, tape or CD.    7.   I consent to the presence of a  or observers in the operating room as deemed necessary by my physician or their designees. 8.   I recognize that in the event my procedure results in extended X-Ray/fluoroscopy time, I may develop a skin reaction. 9.  If I have a Do Not Attempt Resuscitation (DNAR) order in place, that status will be suspended while in the operating room, procedural suite, and during the recovery period unless otherwise explicitly stated by me (or a person authorized to consent on my behalf). The surgeon or my attending physician will determine when the applicable recovery period ends for purposes of reinstating the DNAR order. 10. Patients having a sterilization procedure: I understand that if the procedure is successful the results will be permanent and it will therefore be impossible for me to inseminate, conceive, or bear children. I also understand that the procedure is intended to result in sterility, although the result has not been guaranteed. 11. I acknowledge that my physician has explained sedation/analgesia administration to me including the risk and benefits I consent to the administration of sedation/analgesia as may be necessary or desirable in the judgment of my physician. I CERTIFY THAT I HAVE READ AND FULLY UNDERSTAND THE ABOVE CONSENT TO OPERATION and/or OTHER PROCEDURE.    _________________________________________  __________________________________  Signature of Patient     Signature of Responsible Person         ___________________________________         Printed Name of Responsible Person           _________________________________                  Relationship to Patient  _________________________________________  ______________________________  Signature of Witness          Date  Time    STATEMENT OF PHYSICIAN My signature below affirms that prior to the time of the procedure; I have explained to the patient and/or his/her legal representative, the risks and benefits involved in the proposed treatment and any reasonable alternative to the proposed treatment. I have also explained the risks and benefits involved in refusal of the proposed treatment and alternatives to the proposed treatment and have answered the patient's questions.  If I have a significant financial interest in a co-management agreement or a significant financial interest in any product or implant, or other significant relationship used in this procedure/surgery, I have disclosed this and had a discussion with my patient.     _______________________________________________________________ _____________________________  Pool [facility] of Physician)                                                                                         (Date)                                   (Time)        Patient Name: Kimberly Morse    : 1955   Printed: 2022      Medical Record #: [id number]                                              Page 1 of 1

## (undated) NOTE — IP AVS SNAPSHOT
[facility] [place]  [address] [place], [address] ~ [phone]                Discharge Summary   3/15/2017    Mickeal Vazquez           Admission Information        Provider Department    3/15/2017 Tee Abdalla MD  0sw-A         T 1 kit by Does not apply route 3 (three) times daily before meals.  Test blood glucose before meals and or signs, symptoms of hypoglycemia    Jennifer Herrera                          CONTINUE taking these medications        Instructions Authorizing Provider - [address] w/Device Kit              Patient Instructions       You have Diabetes    Please check fasting glucose daily, keep record and bring to the appointment with Dr. Richmond Mendiola MD  Please seek immediate medical attention for Mar 20, 2017  1:00 PM   Step 1/Initial Visit with Gunjan Agrawal, [address] [facility] ([facility])    [address], [address] [place] [address] [phone]              Discharge Orders Patient has following limitations/impairments:  Patient does not have any limitations      Immunization History as of 3/16/2017  Reviewed on 3/15/2017    INFLUENZA 10/3/2016, 10/13/2013, 10/3/2012    TDAP 10/4/2012    Zoster (Shingles) 8/20/2015      Rece - If you have concerns related to behavioral health issues or thoughts of harming yourself, contact [address] [place] [address] at [phone].     - If you don’t have insurance, Nicolasa Currie has partnered with [employer] Use: Treat abnormal blood pressure (high or low), cardiac conditions; and/or abnormal heart rates/rhythms   Most common side effects: Dizziness or feeling lightheaded (especially with standing), heart rate changes, headaches, nausea/vomiting   What to repo

## (undated) NOTE — IP AVS SNAPSHOT
Patient Demographics     Address Phone E-mail Address    Laura Carter [address]  [place] [phone] (Home)  [phone] (Mobile) [email or web address] ([email or web address] [place]      Emergency Contact(s)     Name Relation Home Work Naty Ellington [phone] 7. persistent dizziness or light-headedness  8. extreme fatigue  9. Numbness/tingling  10. Difficulty urinating or defecating  11.  Bleeding    Do not drive when taking pain medications  Do not exceed 4 grams acetaminophen within 24 hours      Follow-up Inf Commonly known as:  GLUCOPHAGE   Next dose due:  Start taking on Praful 3/19.          Take 1 tablet (500 mg total) by mouth daily with breakfast. Refills from Dr. Reyna Grimes MD [address] [place] [address] [address] [address]   Stop taking on: [id number] acetaminophen (TYLENOL) tab 650 mg 03/16/17 1042 Given      [id number] escitalopram (LEXAPRO) tab 20 mg 03/16/17 0857 Given      [id number] losartan (COZAAR) tab 100 mg 03/16/17 0857 Given                    Recent Vital Signs       Most Recent [facility] PCP;Demarcus Hannon MD  CC: pain    HPI 60 yo female with hx of anxiety, HTN, HL, depression presented for evaluation of chest discomfort. Started last night, described as tightness, radiated to jaw, felt seaty.  Currently no chest pain, no SOB, pain qu Zolpidem Tartrate (AMBIEN) 10 MG Oral Tab Take 1 tablet (10 mg total) by mouth nightly as needed for Sleep. Disp: 30 tablet Rfl: 5   alprazolam 1 MG Oral Tab Take 1 tablet (1 mg total) by mouth 2 (two) times daily as needed for Anxiety.  Disp: 60 tablet Rfl Electronically signed by Rebecca Velasquez MD on 3/15/2017  7:18 PM               Consults - MD Consult Notes      Consults by Lisa Raygoza MD at 3/15/2017 12:00 PM     Author:  Lisa Raygoza MD Service:  (none) Author Type:  Physician    Filed: Diagnosis Date   • Anxiety      situational   • ANXIETY    • HYPERLIPIDEMIA    • HYPERTENSION    • HYPOTHYROIDISM    • Depression    • Anxiety state, unspecified    • Unspecified essential hypertension    • Other and unspecified hyperlipidemia          Pas HEENT: No focal deficits. some tenderness around the angle of the right jaw, pupils are equal,  Neck:  JVP normal, no carotid bruits  Cardiac: Regular rhythm, S1, S2 normal,S4, No murmur.   Lungs: good chest wall expansion, clear  Abdomen: Soft, mildly obese [address] [facility]         3/15/2017   1:02 PM                 Electronically signed by Lisa Raygoza MD on 3/15/2017  1:03 PM               Discharge Summaries - D/C Summary      Discharge Summaries by Veronica Wall History of Present Illness: 58 yo female with hx of anxiety, HTN, HL, depression presented for evaluation of chest discomfort. Started last night, described as tightness, radiated to jaw, felt seaty.  Currently no chest pain, no SOB, pain quantity, quality, [address]  Qty: 10 tablet Refills: 0      CONTINUE these medications which have NOT CHANGED    Zolpidem Tartrate (AMBIEN) 10 MG Oral Tab  Take 1 tablet (10 mg total) by mouth nightly as needed for Sleep.   Qty: 3 Notify physician if you experience any of the followin. Fever  2. . persistent Nausea/vomiting  3. severe uncontrolled pain  4. redness, tenderness, or signs of infection (pain, swelling, redness, odor or green/yellow discharge)  5.  difficulty breath

## (undated) NOTE — LETTER
[address]  [address]  Authorization for Surgical Operation and Procedure     Date:10/31/23___________                                                                                                         Time:_14:11_________  I hereby authorize Dr. Lamar my physician and his/her assistants (if applicable), which may include medical students, residents, and/or fellows, to perform the following surgical operation/ procedure and administer such anesthesia as may be determined necessary by my physician:  Operation/Procedure name (s)  Right Middle Meningeal Artery Embolization on Ivanna Carissa Mitch   2.   I recognize that during the surgical operation/procedure, unforeseen conditions may necessitate additional or different procedures than those listed above.  I, therefore, further authorize and request that the above-named surgeon, assistants, or designees perform such procedures as are, in their judgment, necessary and desirable.    3.   My surgeon/physician has discussed prior to my surgery the potential benefits, risks and side effects of this procedure; the likelihood of achieving goals; and potential problems that might occur during recuperation.  They also discussed reasonable alternatives to the procedure, including risks, benefits, and side effects related to the alternatives and risks related to not receiving this procedure.  I have had all my questions answered and I acknowledge that no guarantee has been made as to the result that may be obtained.    4.   Should the need arise during my operation/procedure, which includes change of level of care prior to discharge, I also consent to the administration of blood and/or blood products.  Further, I understand that despite careful testing and screening of blood or blood products by collecting agencies, I may still be subject to ill effects as a result of receiving a blood transfusion and/or blood products.  The  following are some, but not all, of the potential risks that can occur: fever and allergic reactions, hemolytic reactions, transmission of diseases such as Hepatitis, AIDS and Cytomegalovirus (CMV) and fluid overload.  In the event that I wish to have an autologous transfusion of my own blood, or a directed donor transfusion, I will discuss this with my physician.  Check only if Refusing Blood or Blood Products  I understand refusal of blood or blood products as deemed necessary by my physician may have serious consequences to my condition to include possible death. I hereby assume responsibility for my refusal and release the hospital, its personnel, and my physicians from any responsibility for the consequences of my refusal.          o  Refuse      5.   I authorize the use of any specimen, organs, tissues, body parts or foreign objects that may be removed from my body during the operation/procedure for diagnosis, research or teaching purposes and their subsequent disposal by hospital authorities.  I also authorize the release of specimen test results and/or written reports to my treating physician on the hospital medical staff or other referring or consulting physicians involved in my care, at the discretion of the Pathologist or my treating physician.    6.   I consent to the photographing or videotaping of the operations or procedures to be performed, including appropriate portions of my body for medical, scientific, or educational purposes, provided my identity is not revealed by the pictures or by descriptive texts accompanying them.  If the procedure has been photographed/videotaped, the surgeon will obtain the original picture, image, videotape or CD.  The hospital will not be responsible for storage, release or maintenance of the picture, image, tape or CD.    7.   I consent to the presence of a  or observers in the operating room as deemed necessary by my physician or their designees.     8.   I recognize that in the event my procedure results in extended X-Ray/fluoroscopy time, I may develop a skin reaction.    9. If I have a Do Not Attempt Resuscitation (DNAR) order in place, that status will be suspended while in the operating room, procedural suite, and during the recovery period unless otherwise explicitly stated by me (or a person authorized to consent on my behalf). The surgeon or my attending physician will determine when the applicable recovery period ends for purposes of reinstating the DNAR order.  10. Patients having a sterilization procedure: I understand that if the procedure is successful the results will be permanent and it will therefore be impossible for me to inseminate, conceive, or bear children.  I also understand that the procedure is intended to result in sterility, although the result has not been guaranteed.   11. I acknowledge that my physician has explained sedation/analgesia administration to me including the risk and benefits I consent to the administration of sedation/analgesia as may be necessary or desirable in the judgment of my physician.    I CERTIFY THAT I HAVE READ AND FULLY UNDERSTAND THE ABOVE CONSENT TO OPERATION and/or OTHER PROCEDURE.    _________________________________________  __________________________________  Signature of Patient     Signature of Responsible Person         ___________________________________         Printed Name of Responsible Person           _________________________________                 Relationship to Patient  _________________________________________  ______________________________  Signature of Witness          Date  Time      Patient Name: Ivanna Meyer     : 1955                 Printed: 2024     Medical Record #: [id number]                     Page 1 of [address] [place] [address]  [id number]    Consent for Anesthesia    IIvanna  Carissa Meyer agree to be cared for by an anesthesiologist, who is specially trained to monitor me and give me medicine to put me to sleep or keep me comfortable during my procedure    I understand that my anesthesiologist is not an employee or agent of [facility] [employer]. He or she works for [email or web address].    As the patient asking for anesthesia services, I agree to:  Allow the anesthesiologist (anesthesia doctor) to give me medicine and do additional procedures as necessary. Some examples are: Starting or using an “IV” to give me medicine, fluids or blood during my procedure, and having a breathing tube placed to help me breathe when I’m asleep (intubation). In the event that my heart stops working properly, I understand that my anesthesiologist will make every effort to sustain my life, unless otherwise directed by [facility] Do Not Resuscitate documents.  Tell my anesthesia doctor before my procedure:  If I am pregnant.  The last time that I ate or drank.  All of the medicines I take (including prescriptions, herbal supplements, and pills I can buy without a prescription (including street drugs/illegal medications). Failure to inform my anesthesiologist about these medicines may increase my risk of anesthetic complications.  If I am allergic to anything or have had a reaction to anesthesia before.  I understand how the anesthesia medicine will help me (benefits).  I understand that with any type of anesthesia medicine there are risks:  The most common risks are: nausea, vomiting, sore throat, muscle soreness, damage to my eyes, mouth, or teeth (from breathing tube placement).  Rare risks include: remembering what happened during my procedure, allergic reactions to medications, injury to my airway, heart, lungs, vision, nerves, or muscles and in extremely rare instances death.  My doctor has explained to me other choices available to me for my care  (alternatives).  Pregnant Patients (“epidural”):  I understand that the risks of having an epidural (medicine given into my back to help control pain during labor), include itching, low blood pressure, difficulty urinating, headache or slowing of the baby’s heart. Very rare risks include infection, bleeding, seizure, irregular heart rhythms and nerve injury.  Regional Anesthesia (“spinal”, “epidural”, & “nerve blocks”):  I understand that rare but potential complications include headache, bleeding, infection, seizure, irregular heart rhythms, and nerve injury.    I can change my mind about having anesthesia services at any time before I get the medicine.    _____________________________________________________________________________  Patient (or Representative) Signature/Relationship to Patient  Date   Time    _____________________________________________________________________________   Name (if used)    Language/Organization   Time    _____________________________________________________________________________  Anesthesiologist Signature     Date   Time  I have discussed the procedure and information above with the patient (or patient’s representative) and answered their questions. The patient or their representative has agreed to have anesthesia services.    _____________________________________________________________________________  Witness        Date   Time  I have verified that the signature is that of the patient or patient’s representative, and that it was signed before the procedure  Patient Name: Ivanna Meyer     : 1955                 Printed: 2024     Medical Record #: [id number]                     Page 2 of 2